# Patient Record
Sex: FEMALE | Race: WHITE | NOT HISPANIC OR LATINO | Employment: FULL TIME | URBAN - NONMETROPOLITAN AREA
[De-identification: names, ages, dates, MRNs, and addresses within clinical notes are randomized per-mention and may not be internally consistent; named-entity substitution may affect disease eponyms.]

---

## 2021-02-19 ENCOUNTER — OFFICE VISIT (OUTPATIENT)
Dept: FAMILY MEDICINE CLINIC | Facility: CLINIC | Age: 36
End: 2021-02-19

## 2021-02-19 VITALS
DIASTOLIC BLOOD PRESSURE: 78 MMHG | HEART RATE: 81 BPM | HEIGHT: 60 IN | TEMPERATURE: 96.9 F | OXYGEN SATURATION: 98 % | SYSTOLIC BLOOD PRESSURE: 126 MMHG | BODY MASS INDEX: 35.18 KG/M2 | WEIGHT: 179.2 LBS

## 2021-02-19 DIAGNOSIS — R05.3 CHRONIC COUGH: ICD-10-CM

## 2021-02-19 DIAGNOSIS — J45.20 MILD INTERMITTENT EXTRINSIC ASTHMA WITHOUT COMPLICATION: ICD-10-CM

## 2021-02-19 DIAGNOSIS — Z13.220 SCREENING CHOLESTEROL LEVEL: ICD-10-CM

## 2021-02-19 DIAGNOSIS — E03.9 ACQUIRED HYPOTHYROIDISM: Primary | ICD-10-CM

## 2021-02-19 PROCEDURE — 80053 COMPREHEN METABOLIC PANEL: CPT | Performed by: FAMILY MEDICINE

## 2021-02-19 PROCEDURE — 80061 LIPID PANEL: CPT | Performed by: FAMILY MEDICINE

## 2021-02-19 PROCEDURE — 99203 OFFICE O/P NEW LOW 30 MIN: CPT | Performed by: FAMILY MEDICINE

## 2021-02-19 PROCEDURE — 85025 COMPLETE CBC W/AUTO DIFF WBC: CPT | Performed by: FAMILY MEDICINE

## 2021-02-19 PROCEDURE — 36415 COLL VENOUS BLD VENIPUNCTURE: CPT | Performed by: FAMILY MEDICINE

## 2021-02-19 PROCEDURE — 84443 ASSAY THYROID STIM HORMONE: CPT | Performed by: FAMILY MEDICINE

## 2021-02-19 RX ORDER — ALBUTEROL SULFATE 90 UG/1
2 AEROSOL, METERED RESPIRATORY (INHALATION) EVERY 4 HOURS PRN
Qty: 18 G | Refills: 2 | Status: SHIPPED | OUTPATIENT
Start: 2021-02-19 | End: 2021-08-26 | Stop reason: SDUPTHER

## 2021-02-19 RX ORDER — LEVOTHYROXINE SODIUM 0.1 MG/1
100 TABLET ORAL DAILY
Qty: 30 TABLET | Refills: 0 | Status: SHIPPED | OUTPATIENT
Start: 2021-02-19 | End: 2021-02-22 | Stop reason: SDUPTHER

## 2021-02-19 RX ORDER — LEVOTHYROXINE SODIUM 0.1 MG/1
100 TABLET ORAL DAILY
COMMUNITY
Start: 2021-01-10 | End: 2021-02-19 | Stop reason: SDUPTHER

## 2021-02-19 NOTE — PROGRESS NOTES
Subjective   Ron Oconnell is a 35 y.o. female.   Pt presents today with CC of Hypothyroidism      History of Present Illness   1.  Patient is a 35-year-old female here to establish care.  She has hypothyroidism.  Her last TSH was abnormal about a year ago, reportedly.  She states that her levothyroxine was increased to 100 mcg at that time.  She has been taking this medication and is only missed 1 dose which was this morning as she has run out of her most recent prescription.  She would like a refill at her current dose.  She is agreeable to labs today.  She denies symptoms of hypothyroidism.  #2 she reports chronic cough that comes and goes, lasts a week or so at a time, happens a few times a year.  She has done well in the past with albuterol inhalers.  She would like a refill of albuterol inhaler.  She takes Zyrtec periodically for allergies.  Last Pap smear was 4 years ago.       The following portions of the patient's history were reviewed and updated as appropriate: allergies, current medications, past family history, past medical history, past social history, past surgical history and problem list.    Review of Systems   Constitutional: Negative for chills, fever and unexpected weight loss.   HENT: Negative for congestion and sore throat.    Eyes: Negative for blurred vision and visual disturbance.   Respiratory: Negative for cough and wheezing.    Cardiovascular: Negative for chest pain and palpitations.   Gastrointestinal: Negative for abdominal pain and diarrhea.   Endocrine: Negative for cold intolerance and heat intolerance.   Genitourinary: Negative for dysuria.   Musculoskeletal: Negative for arthralgias and neck stiffness.   Neurological: Negative for dizziness, seizures and syncope.   Psychiatric/Behavioral: Negative for self-injury, suicidal ideas and depressed mood.       Objective   Physical Exam  Vitals signs and nursing note reviewed.   Constitutional:       Appearance: She is well-developed.    HENT:      Head: Normocephalic and atraumatic.      Right Ear: External ear normal.      Left Ear: External ear normal.      Nose: Nose normal.   Eyes:      Conjunctiva/sclera: Conjunctivae normal.      Pupils: Pupils are equal, round, and reactive to light.   Neck:      Musculoskeletal: Normal range of motion and neck supple.   Cardiovascular:      Rate and Rhythm: Normal rate and regular rhythm.      Heart sounds: Normal heart sounds.   Pulmonary:      Effort: Pulmonary effort is normal.      Breath sounds: Normal breath sounds.   Abdominal:      General: Bowel sounds are normal.      Palpations: Abdomen is soft.   Skin:     General: Skin is warm and dry.   Neurological:      Mental Status: She is alert and oriented to person, place, and time.   Psychiatric:         Behavior: Behavior normal.           Assessment/Plan   Diagnoses and all orders for this visit:    1. Acquired hypothyroidism (Primary)  -     levothyroxine (SYNTHROID, LEVOTHROID) 100 MCG tablet; Take 1 tablet by mouth Daily.  Dispense: 30 tablet; Refill: 0  -     Comprehensive Metabolic Panel; Future  -     CBC Auto Differential; Future  -     TSH; Future  -     Comprehensive Metabolic Panel  -     CBC Auto Differential  -     TSH  We will continue at 100 mcg daily.  30-day supply sent in the case that we need to increase her dose.  2. Screening cholesterol level  -     Lipid Panel; Future  -     Lipid Panel  Because of obesity and BMI of 35.0, will get cholesterol screening.  She is fasting today.  3. Chronic cough  -     albuterol sulfate  (90 Base) MCG/ACT inhaler; Inhale 2 puffs Every 4 (Four) Hours As Needed for Wheezing.  Dispense: 18 g; Refill: 2    4. Mild intermittent extrinsic asthma without complication  -     albuterol sulfate  (90 Base) MCG/ACT inhaler; Inhale 2 puffs Every 4 (Four) Hours As Needed for Wheezing.  Dispense: 18 g; Refill: 2  Discussed the possible diagnoses leading to chronic cough including postnasal drip,  GERD, primary pulmonary disease such as intermittent asthma.  We will continue albuterol for now.  My suspicion is that it is postnasal drip from allergies.                    Patient's Body mass index is 35 kg/m². BMI is above normal parameters. Recommendations include: exercise counseling and nutrition counseling.

## 2021-02-20 LAB
ALBUMIN SERPL-MCNC: 4.4 G/DL (ref 3.5–5.2)
ALBUMIN/GLOB SERPL: 1.5 G/DL
ALP SERPL-CCNC: 82 U/L (ref 39–117)
ALT SERPL W P-5'-P-CCNC: 14 U/L (ref 1–33)
ANION GAP SERPL CALCULATED.3IONS-SCNC: 9.9 MMOL/L (ref 5–15)
AST SERPL-CCNC: 16 U/L (ref 1–32)
BASOPHILS # BLD AUTO: 0.06 10*3/MM3 (ref 0–0.2)
BASOPHILS NFR BLD AUTO: 0.8 % (ref 0–1.5)
BILIRUB SERPL-MCNC: 0.3 MG/DL (ref 0–1.2)
BUN SERPL-MCNC: 14 MG/DL (ref 6–20)
BUN/CREAT SERPL: 22.2 (ref 7–25)
CALCIUM SPEC-SCNC: 9.5 MG/DL (ref 8.6–10.5)
CHLORIDE SERPL-SCNC: 102 MMOL/L (ref 98–107)
CHOLEST SERPL-MCNC: 221 MG/DL (ref 0–200)
CO2 SERPL-SCNC: 26.1 MMOL/L (ref 22–29)
CREAT SERPL-MCNC: 0.63 MG/DL (ref 0.57–1)
DEPRECATED RDW RBC AUTO: 39.7 FL (ref 37–54)
EOSINOPHIL # BLD AUTO: 0.21 10*3/MM3 (ref 0–0.4)
EOSINOPHIL NFR BLD AUTO: 2.9 % (ref 0.3–6.2)
ERYTHROCYTE [DISTWIDTH] IN BLOOD BY AUTOMATED COUNT: 12.2 % (ref 12.3–15.4)
GFR SERPL CREATININE-BSD FRML MDRD: 109 ML/MIN/1.73
GLOBULIN UR ELPH-MCNC: 3 GM/DL
GLUCOSE SERPL-MCNC: 86 MG/DL (ref 65–99)
HCT VFR BLD AUTO: 43.3 % (ref 34–46.6)
HDLC SERPL-MCNC: 48 MG/DL (ref 40–60)
HGB BLD-MCNC: 14 G/DL (ref 12–15.9)
IMM GRANULOCYTES # BLD AUTO: 0.02 10*3/MM3 (ref 0–0.05)
IMM GRANULOCYTES NFR BLD AUTO: 0.3 % (ref 0–0.5)
LDLC SERPL CALC-MCNC: 153 MG/DL (ref 0–100)
LDLC/HDLC SERPL: 3.13 {RATIO}
LYMPHOCYTES # BLD AUTO: 1.85 10*3/MM3 (ref 0.7–3.1)
LYMPHOCYTES NFR BLD AUTO: 25.9 % (ref 19.6–45.3)
MCH RBC QN AUTO: 28.5 PG (ref 26.6–33)
MCHC RBC AUTO-ENTMCNC: 32.3 G/DL (ref 31.5–35.7)
MCV RBC AUTO: 88 FL (ref 79–97)
MONOCYTES # BLD AUTO: 0.52 10*3/MM3 (ref 0.1–0.9)
MONOCYTES NFR BLD AUTO: 7.3 % (ref 5–12)
NEUTROPHILS NFR BLD AUTO: 4.49 10*3/MM3 (ref 1.7–7)
NEUTROPHILS NFR BLD AUTO: 62.8 % (ref 42.7–76)
NRBC BLD AUTO-RTO: 0 /100 WBC (ref 0–0.2)
PLATELET # BLD AUTO: 376 10*3/MM3 (ref 140–450)
PMV BLD AUTO: 10.2 FL (ref 6–12)
POTASSIUM SERPL-SCNC: 4.5 MMOL/L (ref 3.5–5.2)
PROT SERPL-MCNC: 7.4 G/DL (ref 6–8.5)
RBC # BLD AUTO: 4.92 10*6/MM3 (ref 3.77–5.28)
SODIUM SERPL-SCNC: 138 MMOL/L (ref 136–145)
TRIGL SERPL-MCNC: 114 MG/DL (ref 0–150)
TSH SERPL DL<=0.05 MIU/L-ACNC: 4.86 UIU/ML (ref 0.27–4.2)
VLDLC SERPL-MCNC: 20 MG/DL (ref 5–40)
WBC # BLD AUTO: 7.15 10*3/MM3 (ref 3.4–10.8)

## 2021-02-22 DIAGNOSIS — E03.9 ACQUIRED HYPOTHYROIDISM: ICD-10-CM

## 2021-02-22 RX ORDER — LEVOTHYROXINE SODIUM 112 UG/1
112 TABLET ORAL DAILY
Qty: 90 TABLET | Refills: 2 | Status: SHIPPED | OUTPATIENT
Start: 2021-02-22 | End: 2021-08-26 | Stop reason: SDUPTHER

## 2021-06-22 ENCOUNTER — OFFICE VISIT (OUTPATIENT)
Dept: FAMILY MEDICINE CLINIC | Facility: CLINIC | Age: 36
End: 2021-06-22

## 2021-06-22 VITALS
BODY MASS INDEX: 33.96 KG/M2 | TEMPERATURE: 97.3 F | SYSTOLIC BLOOD PRESSURE: 124 MMHG | WEIGHT: 173 LBS | OXYGEN SATURATION: 99 % | HEART RATE: 80 BPM | HEIGHT: 60 IN | DIASTOLIC BLOOD PRESSURE: 82 MMHG

## 2021-06-22 DIAGNOSIS — Z01.419 WELL FEMALE EXAM WITH ROUTINE GYNECOLOGICAL EXAM: Primary | ICD-10-CM

## 2021-06-22 PROCEDURE — 99395 PREV VISIT EST AGE 18-39: CPT | Performed by: NURSE PRACTITIONER

## 2021-06-22 NOTE — PROGRESS NOTES
"Chief Complaint  Gynecologic Exam    Subjective          Kourtney Velasquez presents to [facility]  Gynecologic Exam  Primary symptoms comment: No concerns no pap in several years.  Records with previous PCP do not note pap with last pregnancy 4 years ago.  First born is age 11 and she is sure she had not had a pap . The patient is experiencing no pain. She is not pregnant. She is sexually active. No, her partner does not have an STD. Her menstrual history has been regular (last menses 2 week ago).       Objective   Vital Signs:   /82 (BP Location: Left arm, Patient Position: Sitting, Cuff Size: Adult)   Pulse 80   Temp 97.3 °F (36.3 °C)   Ht 152.4 cm (60\")   Wt 78.5 kg (173 lb)   SpO2 99%   BMI 33.79 kg/m²     Physical Exam  Vitals and nursing note reviewed.   Constitutional:       General: She is not in acute distress.     Appearance: She is well-developed. She is not ill-appearing.   Cardiovascular:      Rate and Rhythm: Normal rate and regular rhythm.      Heart sounds: Normal heart sounds. No murmur heard.     Pulmonary:      Effort: Pulmonary effort is normal.      Breath sounds: Normal breath sounds.   Chest:      Breasts:         Right: Normal.         Left: Normal.   Abdominal:      General: Bowel sounds are normal.      Palpations: Abdomen is soft.      Tenderness: There is no abdominal tenderness.      Hernia: There is no hernia in the left inguinal area or right inguinal area.   Genitourinary:     Labia:         Right: No rash, tenderness, lesion or injury.         Left: No rash, tenderness, lesion or injury.       Vagina: Normal.      Cervix: Normal.      Uterus: Normal.       Adnexa: Right adnexa normal.      Rectum: Normal.   Lymphadenopathy:      Lower Body: No right inguinal adenopathy. No left inguinal adenopathy.   Skin:     General: Skin is warm and dry.   Neurological:      Mental Status: She is alert and oriented to person, place, and time. "   Psychiatric:         Behavior: Behavior normal.        Result Review :                 Assessment and Plan    Diagnoses and all orders for this visit:    1. Well female exam with routine gynecological exam (Primary)  -     Pap IG, Ct-Ng, Rfx HPV ASCU        Follow Up   No follow-ups on file.  Patient was given instructions and counseling regarding her condition or for health maintenance advice. Please see specific information pulled into the AVS if appropriate.

## 2021-06-25 LAB
C TRACH RRNA CVX QL NAA+PROBE: NEGATIVE
CONV .: NORMAL
CYTOLOGIST CVX/VAG CYTO: NORMAL
CYTOLOGY CVX/VAG DOC CYTO: NORMAL
CYTOLOGY CVX/VAG DOC THIN PREP: NORMAL
DX ICD CODE: NORMAL
HIV 1 & 2 AB SER-IMP: NORMAL
N GONORRHOEA RRNA CVX QL NAA+PROBE: NEGATIVE
OTHER STN SPEC: NORMAL
STAT OF ADQ CVX/VAG CYTO-IMP: NORMAL

## 2021-08-26 ENCOUNTER — OFFICE VISIT (OUTPATIENT)
Dept: FAMILY MEDICINE CLINIC | Facility: CLINIC | Age: 36
End: 2021-08-26

## 2021-08-26 VITALS
HEIGHT: 60 IN | BODY MASS INDEX: 33.41 KG/M2 | DIASTOLIC BLOOD PRESSURE: 68 MMHG | OXYGEN SATURATION: 99 % | HEART RATE: 78 BPM | SYSTOLIC BLOOD PRESSURE: 120 MMHG | WEIGHT: 170.2 LBS | TEMPERATURE: 96.9 F

## 2021-08-26 DIAGNOSIS — E03.9 ACQUIRED HYPOTHYROIDISM: ICD-10-CM

## 2021-08-26 DIAGNOSIS — R05.3 CHRONIC COUGH: ICD-10-CM

## 2021-08-26 DIAGNOSIS — J45.20 MILD INTERMITTENT EXTRINSIC ASTHMA WITHOUT COMPLICATION: ICD-10-CM

## 2021-08-26 DIAGNOSIS — F33.0 MILD EPISODE OF RECURRENT MAJOR DEPRESSIVE DISORDER (HCC): Primary | ICD-10-CM

## 2021-08-26 PROCEDURE — 84443 ASSAY THYROID STIM HORMONE: CPT | Performed by: FAMILY MEDICINE

## 2021-08-26 PROCEDURE — 99214 OFFICE O/P EST MOD 30 MIN: CPT | Performed by: FAMILY MEDICINE

## 2021-08-26 PROCEDURE — 36415 COLL VENOUS BLD VENIPUNCTURE: CPT | Performed by: FAMILY MEDICINE

## 2021-08-26 RX ORDER — LEVOTHYROXINE SODIUM 112 UG/1
112 TABLET ORAL DAILY
Qty: 90 TABLET | Refills: 2 | Status: SHIPPED | OUTPATIENT
Start: 2021-08-26 | End: 2021-12-20 | Stop reason: SDUPTHER

## 2021-08-26 RX ORDER — SERTRALINE HYDROCHLORIDE 25 MG/1
25 TABLET, FILM COATED ORAL DAILY
Qty: 90 TABLET | Refills: 1 | Status: SHIPPED | OUTPATIENT
Start: 2021-08-26 | End: 2021-09-16 | Stop reason: SDUPTHER

## 2021-08-26 RX ORDER — ALBUTEROL SULFATE 90 UG/1
2 AEROSOL, METERED RESPIRATORY (INHALATION) EVERY 4 HOURS PRN
Qty: 18 G | Refills: 2 | Status: SHIPPED | OUTPATIENT
Start: 2021-08-26 | End: 2021-12-20 | Stop reason: SDUPTHER

## 2021-08-26 NOTE — PROGRESS NOTES
Subjective   Kourtney Velasquez is a 36 y.o. female.   Pt presents today with CC of Anxiety      History of Present Illness   1.  Patient is a 36-year-old female here to follow-up on depression with anxiety.  Historically, she was on Zoloft for many years.  She is no longer taking this medication.  She reports that she has been depressed, she has been sleeping more than normal, her interest has been poor in hobbies, her energy level has been poor, her concentration has been fair, she denies homicidal or suicidal ideation.  She would like to be started back on Zoloft, and she would like a referral to a therapist.  #2 she has hypothyroidism for which she takes levothyroxine 112 mcg daily.  Her last TSH was borderline high.  She is agreeable to have her levels rechecked today.  #3 she has chronic cough with mild intermittent asthma.  She uses albuterol as needed.  She would like a refill.           The following portions of the patient's history were reviewed and updated as appropriate: allergies, current medications, past family history, past medical history, past social history, past surgical history and problem list.    Review of Systems   Constitutional: Negative for chills, fever and unexpected weight loss.   HENT: Negative for congestion and sore throat.    Eyes: Negative for blurred vision and visual disturbance.   Respiratory: Negative for cough and wheezing.    Cardiovascular: Negative for chest pain and palpitations.   Gastrointestinal: Negative for abdominal pain and diarrhea.   Endocrine: Negative for cold intolerance and heat intolerance.   Genitourinary: Negative for dysuria.   Musculoskeletal: Negative for arthralgias and neck stiffness.   Neurological: Negative for dizziness, seizures and syncope.   Psychiatric/Behavioral: Negative for self-injury, suicidal ideas and depressed mood.       Objective   Physical Exam  Vitals and nursing note reviewed.   Constitutional:       Appearance: She is well-developed.    HENT:      Head: Normocephalic and atraumatic.      Right Ear: External ear normal.      Left Ear: External ear normal.      Nose: Nose normal.   Eyes:      Conjunctiva/sclera: Conjunctivae normal.      Pupils: Pupils are equal, round, and reactive to light.   Cardiovascular:      Rate and Rhythm: Normal rate and regular rhythm.      Heart sounds: Normal heart sounds.   Pulmonary:      Effort: Pulmonary effort is normal.      Breath sounds: Normal breath sounds.   Abdominal:      General: Bowel sounds are normal.      Palpations: Abdomen is soft.   Musculoskeletal:      Cervical back: Normal range of motion and neck supple.   Skin:     General: Skin is warm and dry.   Neurological:      Mental Status: She is alert and oriented to person, place, and time.   Psychiatric:         Mood and Affect: Mood normal.         Behavior: Behavior normal.         Thought Content: Thought content normal.         Judgment: Judgment normal.           Assessment/Plan   Diagnoses and all orders for this visit:    1. Mild episode of recurrent major depressive disorder (CMS/HCC) (Primary)  -     sertraline (Zoloft) 25 MG tablet; Take 1 tablet by mouth Daily.  Dispense: 90 tablet; Refill: 1  -     Psychotherapy; Future  We will restart Zoloft.  It has been several months since she has been on this medication.  We will restart at 25 mg, follow-up in 3 months.  She denies side effects of this medication in the past.  2. Acquired hypothyroidism  -     levothyroxine (SYNTHROID, LEVOTHROID) 112 MCG tablet; Take 1 tablet by mouth Daily.  Dispense: 90 tablet; Refill: 2  -     TSH; Future  -     TSH    3. Chronic cough  -     albuterol sulfate  (90 Base) MCG/ACT inhaler; Inhale 2 puffs Every 4 (Four) Hours As Needed for Wheezing.  Dispense: 18 g; Refill: 2    4. Mild intermittent extrinsic asthma without complication  -     albuterol sulfate  (90 Base) MCG/ACT inhaler; Inhale 2 puffs Every 4 (Four) Hours As Needed for Wheezing.   Dispense: 18 g; Refill: 2                    Patient's Body mass index is 33.24 kg/m². indicating that she is obese (BMI >30). Obesity-related health conditions include the following: none. Obesity is improving with lifestyle modifications. BMI is is above average; BMI management plan is completed. We discussed portion control and increasing exercise..

## 2021-08-27 ENCOUNTER — TELEPHONE (OUTPATIENT)
Dept: FAMILY MEDICINE CLINIC | Facility: CLINIC | Age: 36
End: 2021-08-27

## 2021-08-27 LAB — TSH SERPL DL<=0.05 MIU/L-ACNC: 3.34 UIU/ML (ref 0.27–4.2)

## 2021-08-27 NOTE — TELEPHONE ENCOUNTER
----- Message from Tiburcio Larson DO sent at 8/27/2021  8:01 AM EDT -----  TSH was normal.  Continue current dose.    MADE PATIENT AWARE AND SHE VOICED UNDERSTANDING.

## 2021-09-16 ENCOUNTER — TELEPHONE (OUTPATIENT)
Dept: FAMILY MEDICINE CLINIC | Facility: CLINIC | Age: 36
End: 2021-09-16

## 2021-09-16 DIAGNOSIS — F33.0 MILD EPISODE OF RECURRENT MAJOR DEPRESSIVE DISORDER (HCC): ICD-10-CM

## 2021-09-16 NOTE — TELEPHONE ENCOUNTER
Medication sent in at 50 mg.  This is an increase from 25 mg.  She may use up her 25 mg tablets by taking 2 of them if she would like.  New prescription has been sent to her pharmacy, however.

## 2021-09-16 NOTE — TELEPHONE ENCOUNTER
Caller: Kourtney Velasquez    Relationship: Self    Best call back number: [phone]    What medication are you requesting:SERTRALINE 50 MG      If a prescription is needed, what is your preferred pharmacy and phone number: [id number] - [place] [address] AT [facility] [address] - [phone]  - [phone] FX     Additional notes: PATIENT IS REQUESTING AN INCREASE IN DOSAGE

## 2021-09-20 ENCOUNTER — OFFICE VISIT (OUTPATIENT)
Dept: PSYCHIATRY | Facility: CLINIC | Age: 36
End: 2021-09-20

## 2021-09-20 DIAGNOSIS — F41.1 GENERALIZED ANXIETY DISORDER: Primary | ICD-10-CM

## 2021-09-20 DIAGNOSIS — F33.0 MDD (MAJOR DEPRESSIVE DISORDER), RECURRENT EPISODE, MILD (HCC): ICD-10-CM

## 2021-09-20 PROCEDURE — 90791 PSYCH DIAGNOSTIC EVALUATION: CPT | Performed by: SOCIAL WORKER

## 2021-09-20 NOTE — PROGRESS NOTES
"Patient ID: Kourtney Velasquez is a 36 y.o. female presenting to [facility] for assessment with Riri Rico LCSW.     Time In: 8:45 am  Time Out: 9:45 am  Name of PCP: Dr. Larson  Referral source: Dr. Larson    Chief Complaint: \"Mostly anxiety and a little bit of depression\"    HPI  Pt stated that her and her  and children moved to this area, and away from everyone she has known. Pt stated that they moved in May 2020. Pt stated that her  works most of the time. Pt stated that they moved from [place], [place] to [place] due to her  getting a job. Pt stated that she has 2 children, ages 9 and 4. Pt stated that her family and friends are in [place]. Pt stated that she went from working full-time to part-time. Pt stated that she is working to get out of the house, but they do not have childcare on nights and weekends. Pt stated that she is still in contact with her friends. Pt stated that her  is the owner of [employer] in [place]. 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 Pt stated that she was previously working at a .     Pt stated that she has had anxiety throughout her entire life, but it has increased. Pt stated that her anxiety comes and goes throughout her life. Pt stated that she has a lot of health anxiety. Pt stated that she often feels like something is physically wrong or that she is going to die.  Pt stated that a lot of times she can talk herself down. Pt stated that she has had 2 panic attacks in the past 2 months. Pt stated this is not common for her. Pt stated that she feels as though her heart is beating weird, and \"a flush over her\" and \"feels like she is having a stroke\". Pt stated these symptoms have been present throughout her life. Pt stated that she is having increased sensitivity to loud sounds. Pt stated the sounds easily irritate her. Pt stated that this has been " "present since they moved. Pt stated that other symptoms of anxiety include: heart palpitations, racing thoughts, negative thoughts, feelings of impending doom, forgetfulness, \"mom guilt\". Pt stated that anxiety increases when she is by herself. Pt stated that she has increased anxiety related to driving, especially driving alone with her children.     Pt stated that she does not have any difficulty going to or staying asleep. Pt stated that she was having some difficulty getting out of bed, due to lack of motivation and energy. Pt stated that since resuming her Zoloft that has changed.     Pt stated that she has experienced decreased pleasure in activities. Pt stated that she does not know if she wants to stay at home or if she is scared to take her children out in public. Pt stated that her 4 year old can have some negative behaviors when out and that causes some anxiety.     Pt denied any SI. Pt denied any previous attempts or self harm. Pt denied HI or AVH at time of assessment.     Social History:  Pt was born and raised in [place], [place]. Pt stated that she had a good childhood. Pt stated that her parents are still . Pt stated that she met her current  while working together at [employer]. Pt stated she first got  at age 27, and her 9 year old is their child. Pt stated that they got  due to pregnancy. Pt stated that they were  approximately a year before they . Pt stated that her ex  had a history of substance use. Pt stated that he has since been in treatment, but does not see the child often. Pt stated that she has been  to her current  for 5 years.     Significant Life Events  Has patient been through or witnessed a divorce? yes  Pt has been  one time.    Has patient experienced a death / loss of relationship? no  NA    Has patient experienced a major accident or tragic events? no  NA    Has patient experienced any other " significant life events or trauma (such as verbal, physical, sexual abuse)? no  Patient denied.     Work History  Highest level of education obtained: some college    Ever been active duty in the ? no    Patient's Occupation:  for [employer]    Describe patient's current and past work experience: , , vet tech, retail, and [employer] for 6 years as .       Legal History  The patient has no significant history of legal issues.    Interpersonal/Relational  Marital Status:   Patient's current living situation: Pt currently lives with  and 2 children.   Support system: mother, mother-in-law. No one in this area.  Difficulty getting along with peers: no  Difficulty making new friendships: yes  Difficulty maintaining friendships: no  Close with family members: yes    Mental/Behavioral Health History  History of prior treatment or hospitalization: None reported.    Are there any significant health issues (current or past): hypothyroid    History of seizures: no    Family History of Mental Health:  Mother- depression; maternal uncle- depression    Family History   Problem Relation Age of Onset   • Hypertension Mother    • Cancer Father        Current Medications:   Current Outpatient Medications   Medication Sig Dispense Refill   • albuterol sulfate  (90 Base) MCG/ACT inhaler Inhale 2 puffs Every 4 (Four) Hours As Needed for Wheezing. 18 g 2   • levothyroxine (SYNTHROID, LEVOTHROID) 112 MCG tablet Take 1 tablet by mouth Daily. 90 tablet 2   • sertraline (Zoloft) 50 MG tablet Take 1 tablet by mouth Daily. 90 tablet 0     No current facility-administered medications for this visit.       History of Substance Use:   Patient answered no  to experiencing two or more of the following problems related to substance use: using more than intended or over longer period than intended; difficulty quitting or cutting back use; spending a great deal of  time obtaining, using, or recovering from using; craving or strong desire or urge to use;  work and/or school problems; financial problems; family problems; using in dangerous situations; physical or mental health problems; relapse; feelings of guilt or remorse about use; times when used and/or drank alone; needing to use more in order to achieve the desired effect; illness or withdrawal when stopping or cutting back use; using to relieve or avoid getting ill or developing withdrawal symptoms; and black outs and/or memory issues when using.        Substance Age Frequency Amount Method Last use   Nicotine        Alcohol        Marijuana        Benzo        Pain Pills        Cocaine        Meth        Heroin        Suboxone        Synthetics/Other:              PHQ-Score Total:  PHQ-9 Total Score:  16- Moderately Severe Depression  YADY-7 Total Score: 18- Severe Anxiety    (Scales based on 0 - 10 with 10 being the worst)  Depression: 5 Anxiety: 2       SUICIDE RISK ASSESSMENT/CSSRS  1. Does patient have thoughts of suicide? no  2. Does patient have intent for suicide? no  3. Does patient have a current plan for suicide? no  4. History of suicide attempts: no  5. Family history of suicide or attempts: no  6. History of violent behaviors towards others or property or thoughts of committing suicide: no  7. History of sexual aggression toward others: no  8. Access to firearms or weapons: yes    Mental Status Exam:   Hygiene:   good  Cooperation:  Cooperative  Eye Contact:  Good  Psychomotor Behavior:  Appropriate  Affect:  Appropriate  Mood: fluctates  Hopelessness: Denies  Speech:  Normal  Thought Process:  Goal directed  Thought Content:  Mood congruent  Suicidal:  None  Homicidal:  None  Hallucinations:  None  Delusion:  None  Memory:  Intact  Orientation:  Person, Place, Time and Situation  Reliability:  fair  Insight:  Fair  Judgement:  Fair  Impulse Control:  Fair    Impression/Formulation:    VISIT DIAGNOSIS:      "ICD-10-CM ICD-9-CM   1. Generalized anxiety disorder  F41.1 300.02   2. MDD (major depressive disorder), recurrent episode, mild (CMS/HCC)  F33.0 296.31        Patient appeared alert and oriented.  Patient is voluntarily requesting to begin outpatient therapy at [facility].  Patient is receptive to assistance with maintaining a stable lifestyle.  Patient presents with history of anxiety and depression.  Patient is agreeable to attend routine therapy sessions.  Patient expressed desire to maintain stability and participate in the therapeutic process.        Crisis Plan:  Symptoms and/or behaviors to indicate a crisis: Excessive worry or fear, Feeling sad or low, Extreme mood changes; including uncontrollable \"highs\" or euphoria, Prolonged irritability or anger, Isolation, Increased hunger or lack of appetite, Difficulty perceiving reality , Physical ailments without obvious causes and Self-doubt    What calming techniques or other strategies will patient use to de-esclate and stay safe: slow down, breathe, visualize calming self, think it though, listen to music, change focus, take a walk    Who is one person patient can contact to assist with de-escalation? Geraldine- friend    If symptoms/behaviors persist, patient will present to the nearest hospital for an assessment. Advised patient of [facility] ER 24/7 assessment services.       Plan:   Obtain release of information for current treatment team for continuity of care  Patient will adhere to medication regimen as prescribed and report any side effects. Patient will contact this office, call 911 or present to the nearest emergency room should suicidal or homicidal ideations occur.  Begin psychotherapy next week and do weekly appointments.       This document has been electronically signed by Riri Rico LCSW  September 20, 2021 09:54 EDT      Part of this note may be an electronic transcription/translation of spoken language to printed " text using the Dragon Dictation System.

## 2021-10-11 ENCOUNTER — OFFICE VISIT (OUTPATIENT)
Dept: PSYCHIATRY | Facility: CLINIC | Age: 36
End: 2021-10-11

## 2021-10-11 DIAGNOSIS — F33.0 MDD (MAJOR DEPRESSIVE DISORDER), RECURRENT EPISODE, MILD (HCC): ICD-10-CM

## 2021-10-11 DIAGNOSIS — F41.1 GENERALIZED ANXIETY DISORDER: Primary | ICD-10-CM

## 2021-10-11 PROCEDURE — 90837 PSYTX W PT 60 MINUTES: CPT | Performed by: SOCIAL WORKER

## 2021-10-11 NOTE — PROGRESS NOTES
Date: October 11, 2021  Time In: 9:00 am  Time Out: 10:00 am      PROGRESS NOTE  Data:  Kourtney Velasquez is a 36 y.o. female who presents today for individual therapy session at [facility] with Riri Rico LCSW.  Patient stated that she does not think her medications seem to be working at this time.  Patient stated that she has previously taken Zoloft but has been off of it for approximately a year.  Patient stated that PCP had restarted her on the medication but started at a low dosage.  Patient stated that she recently had an increase but it was not at the dosage that she has previously been on.  Patient stated that she feels like she has had an increase in depression, and increase in sleep, decreased motivation, decrease in patient's, and is often easily irritated.  Patient stated that most of the symptoms are towards her children.  Patient stated that she often yells at her 4-year-old.  Patient stated that in the past she has struggled with anxiety but she is not struggled with depression before.  Patient stated that she continues to have a lot of health anxiety.  Patient stated that her  had increased blood pressure and had to stay in the hospital overnight and that also increased her anxiety.  Patient stated that her 9-year-old is getting bullied at school.  Patient and therapist discussed ways to help manage irritability and anger at home.      Clinical Maneuvering/Intervention:    (Scales based on 0 - 10 with 10 being the worst)  Depression: 7 Anxiety: 6       Assisted patient in processing above session content; acknowledged and normalized patient’s thoughts, feelings, and concerns.  Rationalized patient thought process regarding anxiety and depression.  Discussed triggers associated with patient's anxiety and depression.  Also discussed coping skills for patient to implement such as self-care and timeout.    Allowed patient to freely discuss issues without interruption or  judgment. Provided safe, confidential environment to facilitate the development of positive therapeutic relationship and encourage open, honest communication. Assisted patient in identifying risk factors which would indicate the need for higher level of care including thoughts to harm self or others and/or self-harming behavior and encouraged patient to contact this office, call 911, or present to the nearest emergency room should any of these events occur. Discussed crisis intervention services and means to access. Patient adamantly and convincingly denies current suicidal or homicidal ideation or perceptual disturbance.    Assessment   Patient appears to maintain relative stability as compared to their baseline.  However, patient continues to struggle with anxiety and depression which continues to cause impairment in important areas of functioning.  A result, they can be reasonably expected to continue to benefit from treatment and would likely be at increased risk for decompensation otherwise.    Mental Status Exam:   Hygiene:   good  Cooperation:  Cooperative  Eye Contact:  Good  Psychomotor Behavior:  Appropriate  Affect:  Appropriate  Mood: depressed and anxious  Speech:  Normal  Thought Process:  Goal directed  Thought Content:  Mood congruent  Suicidal:  None  Homicidal:  None  Hallucinations:  None  Delusion:  None  Memory:  Intact  Orientation:  Person, Place, Time and Situation  Reliability:  fair  Insight:  Fair  Judgement:  Fair  Impulse Control:  Fair  Physical/Medical Issues:  No        Patient's Support Network Includes:  , mother and extended family    Functional Status: Moderate impairment     Progress toward goal: Not at goal    Prognosis: Fair with Ongoing Treatment          Plan     Patient to work on improved communication and setting boundaries.  Patient will continue in individual outpatient therapy with focus on improved functioning and coping skills, maintaining stability, and  avoiding decompensation and the need for higher level of care.    Patient will adhere to medication regimen as prescribed and report any side effects. Patient will contact this office, call 911 or present to the nearest emergency room should suicidal or homicidal ideations occur. Provide Cognitive Behavioral Therapy and Solution Focused Therapy to improve functioning, maintain stability, and avoid decompensation and the need for higher level of care.     Return in about 2 weeks, or earlier if symptoms worsen or fail to improve.           VISIT DIAGNOSIS:     ICD-10-CM ICD-9-CM   1. Generalized anxiety disorder  F41.1 300.02   2. MDD (major depressive disorder), recurrent episode, mild (HCC)  F33.0 296.31        This document has been electronically signed by Riri Rico LCSW, October 11, 2021, 13:08 EDT    Part of this note may be an electronic transcription/translation of spoken language to printed text using the Dragon Dictation System.

## 2021-10-26 ENCOUNTER — OFFICE VISIT (OUTPATIENT)
Dept: PSYCHIATRY | Facility: CLINIC | Age: 36
End: 2021-10-26

## 2021-10-26 DIAGNOSIS — F41.1 GENERALIZED ANXIETY DISORDER: Primary | ICD-10-CM

## 2021-10-26 DIAGNOSIS — F33.0 MDD (MAJOR DEPRESSIVE DISORDER), RECURRENT EPISODE, MILD (HCC): ICD-10-CM

## 2021-10-26 PROCEDURE — 90837 PSYTX W PT 60 MINUTES: CPT | Performed by: SOCIAL WORKER

## 2021-10-26 NOTE — PROGRESS NOTES
Date: October 26, 2021  Time In: 8:45 am  Time Out: 9:45 am      PROGRESS NOTE  Data:  Kourtney Velasquez is a 36 y.o. female who presents today for individual therapy session at [facility] with Riri Rico LCSW.  Patient stated her 4-year-old is currently having to stay at home due to one of the other kids in his class having COVID.  Patient stated that this is caused an increase in her negative symptoms.  Patient stated that she try to utilize a timeout for herself but was already upset by the time she realized she should.  Patient stated that her son has a lot of energy and is up all of the time.  Patient and therapist discussed routines and exercise.  Patient stated that when her  is home he mostly sits at the computer and plays games.  Patient stated that she understands that he needs time to decompress but it is also frustrating.  Patient and therapist discussed continued struggles with the move to [place].  Patient stated that she is not happy.  Patient stated that she misses having her friends and family near her.  Patient stated that she has not been able to make any friends or connections with others since moving to [place] a year and a half ago. Pt stated that it continues to be difficult for her and she has not found a way to establish any connections. Pt stated that her children are involved in some activities, but they are independent activities. Pt stated that she is taking her Zoloft, but it is still not effective. Pt stated that she was previously on 150mg. Will schedule pt appointment with medication provider.       Clinical Maneuvering/Intervention:    (Scales based on 0 - 10 with 10 being the worst)  Depression: 8 Anxiety: 6       Assisted patient in processing above session content; acknowledged and normalized patient’s thoughts, feelings, and concerns.  Rationalized patient thought process regarding depression and change.  Discussed triggers associated with  patient's depression.  Also discussed coping skills for patient to implement such as timeouts.    Allowed patient to freely discuss issues without interruption or judgment. Provided safe, confidential environment to facilitate the development of positive therapeutic relationship and encourage open, honest communication. Assisted patient in identifying risk factors which would indicate the need for higher level of care including thoughts to harm self or others and/or self-harming behavior and encouraged patient to contact this office, call 911, or present to the nearest emergency room should any of these events occur. Discussed crisis intervention services and means to access. Patient adamantly and convincingly denies current suicidal or homicidal ideation or perceptual disturbance.    Assessment   Patient appears to maintain relative stability as compared to their baseline.  However, patient continues to struggle with depression and change which continues to cause impairment in important areas of functioning.  A result, they can be reasonably expected to continue to benefit from treatment and would likely be at increased risk for decompensation otherwise.    Mental Status Exam:   Hygiene:   good  Cooperation:  Cooperative  Eye Contact:  Good  Psychomotor Behavior:  Appropriate  Affect:  Appropriate  Mood: sad and depressed  Speech:  Normal  Thought Process:  Goal directed  Thought Content:  Mood congruent  Suicidal:  None  Homicidal:  None  Hallucinations:  None  Delusion:  None  Memory:  Intact  Orientation:  Person, Place, Time and Situation  Reliability:  fair  Insight:  Fair  Judgement:  Fair  Impulse Control:  Fair  Physical/Medical Issues:  No      PHQ-Score Total:  PHQ-9 Total Score:        Patient's Support Network Includes:  , mother and extended family    Functional Status: Moderate impairment     Progress toward goal: Not at goal    Prognosis: Fair with Ongoing Treatment          Plan     Patient  will think about activities that she would enjoy.  Patient will schedule and attend appointment with psychiatric nurse practitioner.  Patient will continue in individual outpatient therapy with focus on improved functioning and coping skills, maintaining stability, and avoiding decompensation and the need for higher level of care.    Patient will adhere to medication regimen as prescribed and report any side effects. Patient will contact this office, call 911 or present to the nearest emergency room should suicidal or homicidal ideations occur. Provide Cognitive Behavioral Therapy and Solution Focused Therapy to improve functioning, maintain stability, and avoid decompensation and the need for higher level of care.     Return in about 2 weeks, or earlier if symptoms worsen or fail to improve.           VISIT DIAGNOSIS:     ICD-10-CM ICD-9-CM   1. Generalized anxiety disorder  F41.1 300.02   2. MDD (major depressive disorder), recurrent episode, mild (HCC)  F33.0 296.31        This document has been electronically signed by Riri Rico LCSW, October 26, 2021, 10:32 EDT    Part of this note may be an electronic transcription/translation of spoken language to printed text using the Dragon Dictation System.

## 2021-11-04 ENCOUNTER — OFFICE VISIT (OUTPATIENT)
Dept: PSYCHIATRY | Facility: CLINIC | Age: 36
End: 2021-11-04

## 2021-11-04 VITALS
SYSTOLIC BLOOD PRESSURE: 123 MMHG | BODY MASS INDEX: 33.85 KG/M2 | HEART RATE: 74 BPM | HEIGHT: 60 IN | DIASTOLIC BLOOD PRESSURE: 79 MMHG | WEIGHT: 172.4 LBS

## 2021-11-04 DIAGNOSIS — F90.0 ADHD (ATTENTION DEFICIT HYPERACTIVITY DISORDER), INATTENTIVE TYPE: ICD-10-CM

## 2021-11-04 DIAGNOSIS — F32.2 CURRENT SEVERE EPISODE OF MAJOR DEPRESSIVE DISORDER WITHOUT PSYCHOTIC FEATURES WITHOUT PRIOR EPISODE (HCC): Primary | ICD-10-CM

## 2021-11-04 DIAGNOSIS — F41.1 GENERALIZED ANXIETY DISORDER: ICD-10-CM

## 2021-11-04 PROCEDURE — 90792 PSYCH DIAG EVAL W/MED SRVCS: CPT

## 2021-11-04 RX ORDER — PROPRANOLOL HYDROCHLORIDE 10 MG/1
10 TABLET ORAL 2 TIMES DAILY PRN
Qty: 15 TABLET | Refills: 0 | Status: SHIPPED | OUTPATIENT
Start: 2021-11-04 | End: 2021-12-02

## 2021-11-04 RX ORDER — SERTRALINE HYDROCHLORIDE 100 MG/1
TABLET, FILM COATED ORAL
Qty: 38 TABLET | Refills: 0 | Status: SHIPPED | OUTPATIENT
Start: 2021-11-04 | End: 2021-12-02 | Stop reason: SDUPTHER

## 2021-11-04 RX ORDER — BUPROPION HYDROCHLORIDE 150 MG/1
150 TABLET ORAL EVERY MORNING
Qty: 30 TABLET | Refills: 0 | Status: SHIPPED | OUTPATIENT
Start: 2021-11-04 | End: 2021-12-02 | Stop reason: SDUPTHER

## 2021-11-04 RX ORDER — PRENATAL VIT NO.126/IRON/FOLIC 28MG-0.8MG
TABLET ORAL DAILY
COMMUNITY

## 2021-11-04 RX ORDER — CETIRIZINE HYDROCHLORIDE 10 MG/1
10 TABLET ORAL DAILY
COMMUNITY
End: 2021-12-20 | Stop reason: SDUPTHER

## 2021-11-04 NOTE — PROGRESS NOTES
"Subjective   Kourtney Velasquez is a 36 y.o. female who is here today for initial appointment to evaluate for medication options.  Patient is a referral from Riri Rico LCSW.    Chief Complaint: Anxiety and depression.    HPI:  History of Present Illness  Patient presents today with anxiety and new onset worsening depression that has began over the last year.  She states that everything is gotten worse since she and her family have moved to [place] for employment.  She identifies that she has struggled with anxiety her entire life.  She catches herself worrying all the time over things that she cannot help, always goes to the \"worst case scenario\" and \"what if moments.\"  She identifies that her anxiety is a 6 or 7 on a scale of 0-10 with 10 being the worst.  She states that she has difficulty \" turning her mind off.\"  She expresses that most of what she worries about is focused on her children and their safety.  She endorses occasional episodes of panic that occur on a monthly basis.  She states that she is able to talk herself down when this occurs.  She states \" I feel like I am dying, having a stroke, heart attack, chest pain, my heart racing... I know all of this is my anxiety and that I am okay.\"  Depressive symptomatology has began to appear over the last year and has increased in the last month.  She identifies anhedonia, irritability, crying spells that occur 2-3 times per week, struggling with self care and going to work.  She states that she would just rather lay in bed.  She endorses that she has became more \" snippy with her children.\" She identifies that she has a decreased appetite and eating 1 meal per day; however, has increased in snacking.  She has not noted any significant weight fluctuations. Body mass index is 33.67 kg/m².  Identifies decreased libido.  She states \" I have been having a hard time getting myself to go to work...  My  is my boss so there is no consequences if I do not go to " "work… I do not have to.\" She rates her depression at a 9 or 10 on bad days.  She identifies intrusive thoughts focusing on death.  She states \" I am always afraid of dying or someone around me dying.\"  She states \" I home the entire time that I eat as it makes me feel like it will prevent me from choking and dying.\"  She states her sleep is good and is averaging 7 to 8 hours per night.  Denies nightmares.  She endorses that she has struggled with focus and attention throughout childhood followed into adulthood.  She states that when she was in school she got in trouble for talking too much in class when she was supposed to be quiet.  She identifies difficulty in initiating tasks because she does not know where to start and feels overwhelmed.  She identifies that throughout childhood and into adulthood that she has procrastinated input work off into the last minute as she works better during this time current.  She that she has always started stuff and not finished things.  She identifies periods of hyperfocus where she will go all in for a hobby and then drop it and start something else.  She identifies that she feels disorganized.  And that she is always been indecisive.  She states that she engages sometimes with overspending.  She states \" when I start cleaning house I will start washing dishes, then I will see some towels that need folded, then I will  toys, then I will go to another room and start... I clean all day long and never get 1 thing finished.\"  She identifies that she drinks multiple caffeinated beverages per day; she states that she is able to calm down and focus after she drinks these.  She denies compulsive behavior.  She denies auditory and visual hallucinations.  Denies any history of abuse.  Denies any symptomology of PTSD.  She denies self-harm both current and past.  He adamantly and convincingly denies suicidal or homicidal ideation both currently and in the past.    Past Psych " History: She has been previously diagnosed with generalized anxiety disorder.  She has seen psychiatric providers in the past in her hometown [place], [place].  Denies past inpatient psychiatric hospitalizations.  Denies past self-harm.  Denies past suicidal ideation or attempts.    Previous Psych Meds: Patient states that she has been on medication for anxiety since she was in the first grade.  She does not remember what she took at this time.  She recalls starting Zoloft when she was in middle school.  She states that she has been on this medication on and off throughout adulthood.  She has came on and off of this medication however when discontinuing shortly after her symptoms became worse and has had a start back on it.  She states that she was placed on Xanax while in high school for panic; however, she never took it because she was nervous about taking medication.    Substance Abuse: Denies current or past alcohol use.  Denies current or past substance use.  Denies smoking cigarettes.    Social History: She grew up with mom and dad in [place], [place].  She had 2 siblings; 1 brother and 1 sister.  Both siblings live in [place], [place].  She is not close to them only seeing them on the holidays.  She was  in the past and has 1 child from this relationship.  No MCC problems.  She is currently  and has been for the last 5 years.  She identifies that this is a stable and supportive relationship.  She has 1 child with her  who is 4 years old.  Her  owns [employer] in [facility] and works frequently.  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  Denies past or current legal issues.      Family Psychiatric History:  family history includes Cancer in her father; Hypertension in her mother.    Medical/Surgical History:  Past Medical History:   Diagnosis Date   • Anxiety    • Hypothyroidism      Past Surgical History:   Procedure  Laterality Date   •  SECTION     • KIDNEY STONE SURGERY     • WISDOM TOOTH EXTRACTION         Allergies   Allergen Reactions   • Penicillins Hives           Current Medications:   Current Outpatient Medications   Medication Sig Dispense Refill   • albuterol sulfate  (90 Base) MCG/ACT inhaler Inhale 2 puffs Every 4 (Four) Hours As Needed for Wheezing. 18 g 2   • cetirizine (zyrTEC) 10 MG tablet Take 10 mg by mouth Daily.     • levothyroxine (SYNTHROID, LEVOTHROID) 112 MCG tablet Take 1 tablet by mouth Daily. 90 tablet 2   • Magnesium Gluconate (MAGNESIUM 27 PO) Take  by mouth.     • prenatal vitamin (prenatal, CLASSIC, vitamin) tablet Take  by mouth Daily.     • buPROPion XL (Wellbutrin XL) 150 MG 24 hr tablet Take 1 tablet by mouth Every Morning for 30 days. 30 tablet 0   • propranolol (INDERAL) 10 MG tablet Take 1 tablet by mouth 2 (Two) Times a Day As Needed (anxiety). 15 tablet 0   • sertraline (Zoloft) 100 MG tablet Take 1 tablet by mouth Daily for 14 days, THEN 1.5 tablets Daily for 16 days. Increase dose to 100 mg for 2 weeks then increase 150 mg after 38 tablet 0     No current facility-administered medications for this visit.         Review of Systems   Constitutional: Positive for activity change and appetite change.   HENT: Negative.    Eyes: Negative.    Respiratory: Negative.    Cardiovascular: Negative.  Negative for chest pain and palpitations.   Gastrointestinal: Negative.    Endocrine: Negative.  Negative for cold intolerance.   Genitourinary: Negative.    Musculoskeletal: Negative.    Skin: Negative.    Allergic/Immunologic: Positive for environmental allergies.   Neurological: Negative for dizziness, tremors, seizures, speech difficulty, weakness and headaches.   Hematological: Negative.    Psychiatric/Behavioral: Positive for decreased concentration and dysphoric mood. Negative for suicidal ideas. The patient is nervous/anxious.     denies HEENT, cardiovascular, respiratory, liver,  "renal, GI/, endocrine, neuro, DERM, hematology, immunology, musculoskeletal disorders.    Objective   Physical Exam  Blood pressure 123/79, pulse 74, height 152.4 cm (60\"), weight 78.2 kg (172 lb 6.4 oz), not currently breastfeeding.    Mental Status Exam:   Hygiene:   good  Cooperation:  Cooperative  Eye Contact:  Good  Psychomotor Behavior:  Restless  Affect:  Full range  Hopelessness: 5  Speech:  Normal  Thought Process:  Goal directed  Thought Content:  Normal  Suicidal:  None  Homicidal:  None  Hallucinations:  None  Delusion:  None  Memory:  Intact  Orientation:  Person, Place, Time and Situation  Reliability:  good  Insight:  Good  Judgement:  Good  Impulse Control:  Good  Physical/Medical Issues:  No       Short-term goals: Patient will be compliant with clinic appointments.  Patient will be engaged in therapy, medication compliant with minimal side effects. Patient  will report decrease of symptoms and frequency.    Long-term goals: Patient will have minimal symptoms of  with continued medication management. Patient will be compliant with treatment and appointments.     PHQ-9 Depression Screening  Little interest or pleasure in doing things? 3   Feeling down, depressed, or hopeless? 3   Trouble falling or staying asleep, or sleeping too much? 1   Feeling tired or having little energy? 3   Poor appetite or overeating? 3   Feeling bad about yourself - or that you are a failure or have let yourself or your family down? 1   Trouble concentrating on things, such as reading the newspaper or watching television? 3   Moving or speaking so slowly that other people could have noticed? Or the opposite - being so fidgety or restless that you have been moving around a lot more than usual? 0   Thoughts that you would be better off dead, or of hurting yourself in some way? 0   PHQ-9 Total Score 17   If you checked off any problems, how difficult have these problems made it for you to do your work, take care of things at " home, or get along with other people? Very difficult     YADY-7  Feeling nervous, anxious or on edge: Nearly every day  Not being able to stop or control worrying: Nearly every day  Worrying too much about different things: Nearly every day  Trouble Relaxing: Nearly every day  Being so restless that it is hard to sit still: Nearly every day  Feeling afraid as if something awful might happen: More than half the days  Becoming easily annoyed or irritable: Nearly every day  YADY 7 Total Score: 20    Koutrney Velasquez  reports that she has never smoked. She has never used smokeless tobacco.. I     Functional Status: severe impairment in areas of daily functioning.  Prognosis: Guarded dependent on medication/follow up and treatment plan compliance.    Assessment/Plan   Problems Addressed this Visit     None      Visit Diagnoses     Current severe episode of major depressive disorder without psychotic features without prior episode (HCC)    -  Primary    Relevant Medications    sertraline (Zoloft) 100 MG tablet    buPROPion XL (Wellbutrin XL) 150 MG 24 hr tablet    Generalized anxiety disorder        Relevant Medications    sertraline (Zoloft) 100 MG tablet    buPROPion XL (Wellbutrin XL) 150 MG 24 hr tablet    propranolol (INDERAL) 10 MG tablet    ADHD (attention deficit hyperactivity disorder), inattentive type        Relevant Medications    sertraline (Zoloft) 100 MG tablet    buPROPion XL (Wellbutrin XL) 150 MG 24 hr tablet      Diagnoses       Codes Comments    Current severe episode of major depressive disorder without psychotic features without prior episode (HCC)    -  Primary ICD-10-CM: F32.2  ICD-9-CM: 296.23     Generalized anxiety disorder     ICD-10-CM: F41.1  ICD-9-CM: 300.02     ADHD (attention deficit hyperactivity disorder), inattentive type     ICD-10-CM: F90.0  ICD-9-CM: 314.00             Discussed medication options.  Increase Zoloft to 100 mg p.o. daily for 2 weeks and then increase to 150 mg p.o. daily  after for anxiety and depression.  She denies any current side effects and appears to be tolerating this medication well.  Start Wellbutrin  mg p.o. daily for depression and ADHD.  Start propanolol 10 mg p.o. twice daily as needed for anxiety. Pita CPT 3 completed today and reviewed identifying 6 atypical T-scores suggestive of ADHD with impairment in attention and impulsivity.  I've explained to her that drugs of the SSRI class can have side effects such as weight gain, sexual dysfunction, insomnia, headache, nausea. These medications are generally effective at alleviating symptoms of anxiety and/or depression. Discussed that Wellbutrin has potential to increase heart rate and blood pressure. Discussed with patient the importance of taking Wellbutrin in the morning as it can be activating and that it may increase his anxiety initially.  Educated that propanolol may cause dizziness upon standing therefore to rise slowly when getting up as it is a beta-blocker that has potential to decrease heart rate and blood pressure.  Discussed with patient that untreated ADHD in adults can worsen existing anxiety and depression. Encouraged the patient to continue with psychotherapy to aid in coping skills and she is agreeable with this at this time. Discussed the risks, benefits, and side effects of the medication; client acknowledged and verbally consented.  Patient is aware to contact the [facility] with any worsening of symptom. Patient is agreeable to go to the ER or call 911 should they begin SI/HI.  Prescription sent to pharmacy at this time.    SUPPORTIVE PSYCHOTHERAPY: continuing efforts to promote the therapeutic alliance, address the patient’s issues, and strengthen self awareness, insights, and coping skills.  Assisted patient in processing above session content; acknowledged and normalized patient’s thoughts, feelings, and concerns.  Applied  positive coping skills and behavior management in  "session.Allowed patient to freely discuss issues without interruption or judgment. Provided safe, confidential environment to facilitate the development of positive therapeutic relationship and encourage open, honest communication. Assisted patient in identifying risk factors which would indicate the need for higher level of care including thoughts to harm self or others and/or self-harming behavior and encouraged patient to contact this office, call 911, or present to the nearest emergency room should any of these events occur. Discussed crisis intervention services and means to access.  Patient adamantly and convincingly denies current suicidal or homicidal ideation or perceptual disturbance.    This APRN reviewed with patient behavioral interventions that have been shown to be helpful with ADHD behaviors. These include but are not limited to:  · Maintaining a daily schedule  · Keeping distractions to a minimum  · Setting small, reachable goals   · Rewarding positive behavior  · Using charts and checklists to help stay \"on task\"  · Limiting choices    Return in about 4 weeks (around 12/2/2021), or if symptoms worsen or fail to improve, for Next scheduled follow up.    This document has been electronically signed by DANG East   November 4, 2021 13:04 EDT   Errors in dictation may reflect use of voice recognition software and not all errors in transcription may have been detected prior to signing.  "

## 2021-11-09 ENCOUNTER — OFFICE VISIT (OUTPATIENT)
Dept: PSYCHIATRY | Facility: CLINIC | Age: 36
End: 2021-11-09

## 2021-11-09 DIAGNOSIS — F41.1 GENERALIZED ANXIETY DISORDER: ICD-10-CM

## 2021-11-09 DIAGNOSIS — F32.2 CURRENT SEVERE EPISODE OF MAJOR DEPRESSIVE DISORDER WITHOUT PSYCHOTIC FEATURES WITHOUT PRIOR EPISODE (HCC): Primary | ICD-10-CM

## 2021-11-09 DIAGNOSIS — F90.0 ADHD (ATTENTION DEFICIT HYPERACTIVITY DISORDER), INATTENTIVE TYPE: ICD-10-CM

## 2021-11-09 PROCEDURE — 90837 PSYTX W PT 60 MINUTES: CPT | Performed by: SOCIAL WORKER

## 2021-11-09 NOTE — PROGRESS NOTES
Date: November 9, 2021  Time In: 9:00 am  Time Out: 10:00 am      PROGRESS NOTE  Data:  Kourtney Velasquez is a 36 y.o. female who presents today for individual therapy session at [facility] with Riri Rico LCSW.  Patient stated that she recently was diagnosed with ADHD.  Patient stated that she had her medications increased and also started a new medication for the ADHD.  Patient stated that she understands that it takes a little bit to get into her system.  Patient stated that she has felt good the past couple of days.  Patient stated that she is feeling happier and having some more motivation.  Patient stated that she has had an increase in her anxiety in the form of being very jittery but that was explained to her with the start of the new medication.  Patient stated that she does continue to struggle with health anxiety but she was able to talk herself down especially since given explanation before she started the medication.  Patient stated this time she feels very hopeful that things are improving.  Patient discussed relationship with her .  Patient stated that she often feels bad for not having sex with him.  Patient stated that she does not have a sex drive and also understands that is a side effect of her medication.  Patient stated at this time she does not feel very good about herself.  Patient and therapist discussed self-esteem and ways to work on that.      Clinical Maneuvering/Intervention:    (Scales based on 0 - 10 with 10 being the worst)  Depression: 3 Anxiety: 6       Assisted patient in processing above session content; acknowledged and normalized patient’s thoughts, feelings, and concerns.  Rationalized patient thought process regarding anxiety.  Discussed triggers associated with patient's anxiety.  Also discussed coping skills for patient to implement such as self-care.    Allowed patient to freely discuss issues without interruption or judgment. Provided safe,  confidential environment to facilitate the development of positive therapeutic relationship and encourage open, honest communication. Assisted patient in identifying risk factors which would indicate the need for higher level of care including thoughts to harm self or others and/or self-harming behavior and encouraged patient to contact this office, call 911, or present to the nearest emergency room should any of these events occur. Discussed crisis intervention services and means to access. Patient adamantly and convincingly denies current suicidal or homicidal ideation or perceptual disturbance.    Assessment   Patient appears to maintain relative stability as compared to their baseline.  However, patient continues to struggle with anxiety and depression which continues to cause impairment in important areas of functioning.  A result, they can be reasonably expected to continue to benefit from treatment and would likely be at increased risk for decompensation otherwise.    Mental Status Exam:   Hygiene:   good  Cooperation:  Cooperative  Eye Contact:  Good  Psychomotor Behavior:  Appropriate  Affect:  Appropriate  Mood: normal  Speech:  Normal  Thought Process:  Goal directed  Thought Content:  Mood congruent  Suicidal:  None  Homicidal:  None  Hallucinations:  None  Delusion:  None  Memory:  Intact  Orientation:  Person, Place, Time and Situation  Reliability:  fair  Insight:  Fair  Judgement:  Fair  Impulse Control:  Fair  Physical/Medical Issues:  No        Patient's Support Network Includes:   and extended family    Functional Status: Mild impairment     Progress toward goal: Not at goal    Prognosis: Fair with Ongoing Treatment          Plan     Patient will make a plan and work towards increasing self-esteem.  Patient will continue in individual outpatient therapy with focus on improved functioning and coping skills, maintaining stability, and avoiding decompensation and the need for higher level of  care.    Patient will adhere to medication regimen as prescribed and report any side effects. Patient will contact this office, call 911 or present to the nearest emergency room should suicidal or homicidal ideations occur. Provide Cognitive Behavioral Therapy and Solution Focused Therapy to improve functioning, maintain stability, and avoid decompensation and the need for higher level of care.     Return in about 2 weeks, or earlier if symptoms worsen or fail to improve.           VISIT DIAGNOSIS:     ICD-10-CM ICD-9-CM   1. Current severe episode of major depressive disorder without psychotic features without prior episode ([facility])  F32.2 296.23   2. Generalized anxiety disorder  F41.1 300.02   3. ADHD (attention deficit hyperactivity disorder), inattentive type  F90.0 314.00        This document has been electronically signed by Riri Rico LCSW, November 9, 2021, 11:09 EST    Part of this note may be an electronic transcription/translation of spoken language to printed text using the Dragon Dictation System.

## 2021-12-02 ENCOUNTER — OFFICE VISIT (OUTPATIENT)
Dept: PSYCHIATRY | Facility: CLINIC | Age: 36
End: 2021-12-02

## 2021-12-02 VITALS
OXYGEN SATURATION: 98 % | HEIGHT: 60 IN | HEART RATE: 111 BPM | TEMPERATURE: 97.3 F | DIASTOLIC BLOOD PRESSURE: 86 MMHG | WEIGHT: 169.8 LBS | SYSTOLIC BLOOD PRESSURE: 117 MMHG | BODY MASS INDEX: 33.34 KG/M2

## 2021-12-02 DIAGNOSIS — F33.41 RECURRENT MAJOR DEPRESSIVE DISORDER, IN PARTIAL REMISSION (HCC): ICD-10-CM

## 2021-12-02 DIAGNOSIS — F41.1 GENERALIZED ANXIETY DISORDER: ICD-10-CM

## 2021-12-02 DIAGNOSIS — F90.0 ADHD (ATTENTION DEFICIT HYPERACTIVITY DISORDER), INATTENTIVE TYPE: Primary | ICD-10-CM

## 2021-12-02 PROCEDURE — 99213 OFFICE O/P EST LOW 20 MIN: CPT

## 2021-12-02 RX ORDER — BUPROPION HYDROCHLORIDE 300 MG/1
300 TABLET ORAL EVERY MORNING
Qty: 30 TABLET | Refills: 0 | Status: SHIPPED | OUTPATIENT
Start: 2021-12-02 | End: 2022-01-06 | Stop reason: SDUPTHER

## 2021-12-02 RX ORDER — SERTRALINE HYDROCHLORIDE 100 MG/1
150 TABLET, FILM COATED ORAL DAILY
Qty: 45 TABLET | Refills: 0 | Status: SHIPPED | OUTPATIENT
Start: 2021-12-02 | End: 2022-01-06 | Stop reason: SDUPTHER

## 2021-12-02 NOTE — PROGRESS NOTES
"      Subjective   Kourtney Velasquez is a 36 y.o. female is here today for medication management follow-up.    Chief Complaint: ADHD, depression, anxiety    History of Present Illness: Patient states that the changes in medication has been very beneficial for her.  She states that she starting to feel more like herself.  She has tolerated medication changes without any negative side effects.  Patient states that she has been experienced an increase in energy, increase in motivation, decrease in sadness, decreased since crying spells, decreased fatigue she has began \"going to work consistently.\"  She identifies that she she denies helplessness and hopelessness.  He has noted a decrease in irritability than what was previously reported.  She states her depression is a 1-2 on a scale of 0-10 with 10 being the worst.  She states that she is doing way better than what she was before.She identifies that her anxiety is a 3 on a scale of 0-10 with 10 being the worst.  She endorses a consistent sleep pattern averaging 8 hours per night without intermittent awakenings or difficulty falling asleep.  She identifies that there is been no changes in her appetite with no major weight fluctuations. Body mass index is 33.16 kg/m². She states that she is still worrying about task that she cannot get completed.  She says that when she is at work she finds her self starting many task and having difficulty returning to them.  She states that this makes her day at work difficult as \"she is afraid she is going to forget very important tasks that need to be completed.\"  She states that she is cleaning in spurts at home however still struggling to get task completed there as well.  She has obsessions and compulsions.  She denies symptomology associated with PTSD or gilda and/or hypomania.  She denies AVH.  She denies Sh.  She denies SI and HI.    The following portions of the patient's history were reviewed and updated as appropriate: " allergies, current medications, past family history, past medical history, past social history, past surgical history and problem list.    Review of Systems   Constitutional: Positive for activity change. Negative for appetite change and fatigue.   HENT: Negative for drooling and tinnitus.    Eyes: Negative for photophobia and visual disturbance.   Respiratory: Negative for chest tightness and shortness of breath.    Cardiovascular: Negative for chest pain and palpitations.   Gastrointestinal: Negative for abdominal pain, diarrhea and vomiting.   Endocrine: Negative for polydipsia and polyuria.   Genitourinary: Negative for dysuria, frequency and urgency.   Musculoskeletal: Negative for gait problem and joint swelling.   Skin: Negative for pallor and rash.   Allergic/Immunologic: Negative for environmental allergies and food allergies.   Neurological: Negative for dizziness, tremors, seizures, syncope, facial asymmetry, speech difficulty, weakness, light-headedness, numbness and headaches.   Hematological: Negative for adenopathy. Does not bruise/bleed easily.   Psychiatric/Behavioral: Positive for decreased concentration. Negative for agitation, behavioral problems, confusion, dysphoric mood, hallucinations, self-injury, sleep disturbance and suicidal ideas. The patient is nervous/anxious. The patient is not hyperactive.        Objective   Physical Exam  Vitals reviewed.   Constitutional:       Appearance: Normal appearance. She is normal weight.   HENT:      Head: Normocephalic.      Nose: Nose normal.      Mouth/Throat:      Mouth: Mucous membranes are moist.      Pharynx: Oropharynx is clear.   Eyes:      Extraocular Movements: Extraocular movements intact.      Pupils: Pupils are equal, round, and reactive to light.   Cardiovascular:      Rate and Rhythm: Normal rate.   Pulmonary:      Effort: Pulmonary effort is normal.   Abdominal:      General: Abdomen is flat.   Musculoskeletal:         General: Normal  "range of motion.      Cervical back: Normal range of motion.   Skin:     General: Skin is warm and dry.   Neurological:      General: No focal deficit present.      Mental Status: She is alert and oriented to person, place, and time. Mental status is at baseline.   Psychiatric:         Attention and Perception: Attention and perception normal.         Mood and Affect: Mood and affect normal.         Speech: Speech normal.         Behavior: Behavior normal.         Thought Content: Thought content normal.         Cognition and Memory: Cognition and memory normal.         Judgment: Judgment normal.       Blood pressure 117/86, pulse 111, temperature 97.3 °F (36.3 °C), height 152.4 cm (60\"), weight 77 kg (169 lb 12.8 oz), SpO2 98 %, not currently breastfeeding.    Medication List:   Current Outpatient Medications   Medication Sig Dispense Refill   • albuterol sulfate  (90 Base) MCG/ACT inhaler Inhale 2 puffs Every 4 (Four) Hours As Needed for Wheezing. 18 g 2   • buPROPion XL (Wellbutrin XL) 300 MG 24 hr tablet Take 1 tablet by mouth Every Morning for 30 days. 30 tablet 0   • cetirizine (zyrTEC) 10 MG tablet Take 10 mg by mouth Daily.     • levothyroxine (SYNTHROID, LEVOTHROID) 112 MCG tablet Take 1 tablet by mouth Daily. 90 tablet 2   • Magnesium Gluconate (MAGNESIUM 27 PO) Take  by mouth.     • prenatal vitamin (prenatal, CLASSIC, vitamin) tablet Take  by mouth Daily.     • sertraline (Zoloft) 100 MG tablet Take 1.5 tablets by mouth Daily. 45 tablet 0     No current facility-administered medications for this visit.       Mental Status Exam:   Hygiene:   good  Cooperation:  Cooperative  Eye Contact:  Good  Psychomotor Behavior:  Appropriate  Affect:  Full range  Hopelessness: Denies  Speech:  Normal  Thought Process:  Goal directed  Thought Content:  Normal  Suicidal:  None  Homicidal:  None  Hallucinations:  None  Delusion:  None  Memory:  Intact  Orientation:  Person, Place, Time and Situation  Reliability:  " good  Insight:  Good  Judgement:  Good  Impulse Control:  Good  Physical/Medical Issues:  No       PHQ-2 Depression Screening  Little interest or pleasure in doing things? 1   Feeling down, depressed, or hopeless? 0   PHQ-2 Total Score 1       YADY-7  Feeling nervous, anxious or on edge: Several days  Not being able to stop or control worrying: Several days  Worrying too much about different things: Several days  Trouble Relaxing: Several days  Being so restless that it is hard to sit still: Several days  Feeling afraid as if something awful might happen: Several days  Becoming easily annoyed or irritable: Several days  YADY 7 Total Score: 7    Kourtney Velasquez  reports that she has never smoked. She has never used smokeless tobacco.     Prognosis: Good dependent on medication/follow up and treatment plan compliance.  Functional Status: slight impairment in areas of daily functioning.      Assessment/Plan   Problems Addressed this Visit     None      Visit Diagnoses     ADHD (attention deficit hyperactivity disorder), inattentive type    -  Primary    Relevant Medications    buPROPion XL (Wellbutrin XL) 300 MG 24 hr tablet    sertraline (Zoloft) 100 MG tablet    Recurrent major depressive disorder, in partial remission (HCC)        Relevant Medications    buPROPion XL (Wellbutrin XL) 300 MG 24 hr tablet    sertraline (Zoloft) 100 MG tablet    Generalized anxiety disorder        Relevant Medications    buPROPion XL (Wellbutrin XL) 300 MG 24 hr tablet    sertraline (Zoloft) 100 MG tablet      Diagnoses       Codes Comments    ADHD (attention deficit hyperactivity disorder), inattentive type    -  Primary ICD-10-CM: F90.0  ICD-9-CM: 314.00     Recurrent major depressive disorder, in partial remission (HCC)     ICD-10-CM: F33.41  ICD-9-CM: 296.35     Generalized anxiety disorder     ICD-10-CM: F41.1  ICD-9-CM: 300.02         -Recent UDS reviewed and negative.  -Continue Zoloft 150 mg p.o. daily for anxiety and  depression.  -Increase Wellbutrin XL to 300 mg p.o. daily for ADHD and depression.  -Discontinue propranolol 10 mg p.o. twice daily as there has been no complaints of panic.  -Continue psychotherapy with LCSW.  -Medications and pharmacy at this time.    Discussed medication options.  I've explained to her that drugs of the SSRI class can have side effects such as weight gain, sexual dysfunction, insomnia, headache, nausea. These medications are generally effective at alleviating symptoms of anxiety and/or depression. Let me know if significant side effects do occur.  Reviewed the risks, benefits, and side effects of the medications; patient acknowledged and verbally consented.  Patient is agreeable to call the [facility].  Patient is aware to call 911 or go to the nearest ER should begin having SI/HI.        Return in about 4 weeks (around 12/30/2021), or if symptoms worsen or fail to improve, for Next scheduled follow up.    SUPPORTIVE PSYCHOTHERAPY: continuing efforts to promote the therapeutic alliance, address the patient’s issues, and strengthen self awareness, insights, and coping skills.  Assisted patient in processing above session content; acknowledged and normalized patient’s thoughts, feelings, and concerns.  Applied  positive coping skills and behavior management in session.Allowed patient to freely discuss issues without interruption or judgment. Provided safe, confidential environment to facilitate the development of positive therapeutic relationship and encourage open, honest communication. Assisted patient in identifying risk factors which would indicate the need for higher level of care including thoughts to harm self or others and/or self-harming behavior and encouraged patient to contact this office, call 911, or present to the nearest emergency room should any of these events occur. Discussed crisis intervention services and means to access.  Patient adamantly and convincingly denies current  suicidal or homicidal ideation or perceptual disturbance.      This document has been electronically signed by DANG East  December 2, 2021 18:48 EST   Errors in dictation may reflect use of voice recognition software and not all errors in transcription may have been detected prior to signing.

## 2021-12-07 ENCOUNTER — OFFICE VISIT (OUTPATIENT)
Dept: PSYCHIATRY | Facility: CLINIC | Age: 36
End: 2021-12-07

## 2021-12-07 DIAGNOSIS — F41.1 GENERALIZED ANXIETY DISORDER: Primary | ICD-10-CM

## 2021-12-07 DIAGNOSIS — F33.0 MDD (MAJOR DEPRESSIVE DISORDER), RECURRENT EPISODE, MILD (HCC): ICD-10-CM

## 2021-12-07 DIAGNOSIS — F90.0 ADHD (ATTENTION DEFICIT HYPERACTIVITY DISORDER), INATTENTIVE TYPE: ICD-10-CM

## 2021-12-07 PROCEDURE — 90837 PSYTX W PT 60 MINUTES: CPT | Performed by: SOCIAL WORKER

## 2021-12-07 NOTE — PROGRESS NOTES
"Date: December 7, 2021  Time In: 9:00 am  Time Out: 10:00 am      PROGRESS NOTE  Data:  Kourtney Velasquez is a 36 y.o. female who presents today for individual therapy session at [facility] with Riri Rico LCSW. Pt stated that she recently had an increase in her Wellbutrin and has not started it yet. Pt stated that she is having a lot of anxiety related to the increase in medication. Pt stated that she is having \"weird anxiety\". Pt stated that she is feeling uncomfortable, has a feeling that something bad is going to happen, and having increase in nervousness. Pt stated that she is unsure why she is having an increase in anxiety. Pt stated that she tolerated the medication well originally for about 4 weeks before increase. Pt stated that her and her  have both been sick for the past couple weeks. Pt stated that sickness can increase her health anxiety. Pt stated that there are things that she wants to complete in her house that she does not have the motivation to do. Pt stated that she also worries that she will start a project and not finish it and it will be a mess. Pt stated that right now the thing she wants to do the most is organize her pantry. Pt stated that she has wanted to do it for a while now, but has continued to put it off. Pt and therapist discussed noticing differences between anxiety, depression, and ADHD. Pt stated that she is unsure at this time how her symptoms differ. Pt stated that she often is easily overwhelmed. Pt stated that she wants to take the increased medication. Pt encouraged to contact office if she does have a reaction to medication, which helped to decrease some of her fear and anxiety.       Clinical Maneuvering/Intervention:    (Scales based on 0 - 10 with 10 being the worst)  Depression: 3 Anxiety: 7       Assisted patient in processing above session content; acknowledged and normalized patient’s thoughts, feelings, and concerns.  Rationalized " patient thought process regarding anxiety.  Discussed triggers associated with patient's anxiety.  Also discussed coping skills for patient to implement such as self care.    Allowed patient to freely discuss issues without interruption or judgment. Provided safe, confidential environment to facilitate the development of positive therapeutic relationship and encourage open, honest communication. Assisted patient in identifying risk factors which would indicate the need for higher level of care including thoughts to harm self or others and/or self-harming behavior and encouraged patient to contact this office, call 911, or present to the nearest emergency room should any of these events occur. Discussed crisis intervention services and means to access. Patient adamantly and convincingly denies current suicidal or homicidal ideation or perceptual disturbance.    Assessment   Patient appears to maintain relative stability as compared to their baseline.  However, patient continues to struggle with anxiety which continues to cause impairment in important areas of functioning.  A result, they can be reasonably expected to continue to benefit from treatment and would likely be at increased risk for decompensation otherwise.    Mental Status Exam:   Hygiene:   good  Cooperation:  Cooperative  Eye Contact:  Good  Psychomotor Behavior:  Appropriate  Affect:  Appropriate  Mood: anxious  Speech:  Normal  Thought Process:  Goal directed  Thought Content:  Mood congruent  Suicidal:  None  Homicidal:  None  Hallucinations:  None  Delusion:  None  Memory:  Intact  Orientation:  Person, Place, Time and Situation  Reliability:  fair  Insight:  Fair  Judgement:  Fair  Impulse Control:  Fair  Physical/Medical Issues:  No        Patient's Support Network Includes:   and extended family    Functional Status: Mild impairment     Progress toward goal: Not at goal    Prognosis: Fair with Ongoing Treatment          Plan     Patient  will continue in individual outpatient therapy with focus on improved functioning and coping skills, maintaining stability, and avoiding decompensation and the need for higher level of care.    Patient will adhere to medication regimen as prescribed and report any side effects. Patient will contact this office, call 911 or present to the nearest emergency room should suicidal or homicidal ideations occur. Provide Cognitive Behavioral Therapy and Solution Focused Therapy to improve functioning, maintain stability, and avoid decompensation and the need for higher level of care.     Return in about 2 weeks, or earlier if symptoms worsen or fail to improve.           VISIT DIAGNOSIS:     ICD-10-CM ICD-9-CM   1. Generalized anxiety disorder  F41.1 300.02   2. MDD (major depressive disorder), recurrent episode, mild (HCC)  F33.0 296.31   3. ADHD (attention deficit hyperactivity disorder), inattentive type  F90.0 314.00        This document has been electronically signed by Riri Rico LCSW, December 7, 2021, 11:32 EST    Part of this note may be an electronic transcription/translation of spoken language to printed text using the Dragon Dictation System.

## 2021-12-20 ENCOUNTER — OFFICE VISIT (OUTPATIENT)
Dept: FAMILY MEDICINE CLINIC | Facility: CLINIC | Age: 36
End: 2021-12-20

## 2021-12-20 VITALS
SYSTOLIC BLOOD PRESSURE: 110 MMHG | OXYGEN SATURATION: 99 % | BODY MASS INDEX: 33.26 KG/M2 | WEIGHT: 169.4 LBS | DIASTOLIC BLOOD PRESSURE: 74 MMHG | HEART RATE: 102 BPM | HEIGHT: 60 IN | TEMPERATURE: 97.5 F

## 2021-12-20 DIAGNOSIS — J45.20 MILD INTERMITTENT EXTRINSIC ASTHMA WITHOUT COMPLICATION: ICD-10-CM

## 2021-12-20 DIAGNOSIS — E03.9 ACQUIRED HYPOTHYROIDISM: Primary | ICD-10-CM

## 2021-12-20 DIAGNOSIS — R05.3 CHRONIC COUGH: ICD-10-CM

## 2021-12-20 PROCEDURE — 36415 COLL VENOUS BLD VENIPUNCTURE: CPT | Performed by: FAMILY MEDICINE

## 2021-12-20 PROCEDURE — 84443 ASSAY THYROID STIM HORMONE: CPT | Performed by: FAMILY MEDICINE

## 2021-12-20 PROCEDURE — 85025 COMPLETE CBC W/AUTO DIFF WBC: CPT | Performed by: FAMILY MEDICINE

## 2021-12-20 PROCEDURE — 99214 OFFICE O/P EST MOD 30 MIN: CPT | Performed by: FAMILY MEDICINE

## 2021-12-20 PROCEDURE — 80053 COMPREHEN METABOLIC PANEL: CPT | Performed by: FAMILY MEDICINE

## 2021-12-20 RX ORDER — CETIRIZINE HYDROCHLORIDE 10 MG/1
10 TABLET ORAL DAILY
Qty: 90 TABLET | Refills: 3 | Status: SHIPPED | OUTPATIENT
Start: 2021-12-20 | End: 2022-09-20 | Stop reason: SDUPTHER

## 2021-12-20 RX ORDER — LEVOTHYROXINE SODIUM 112 UG/1
112 TABLET ORAL DAILY
Qty: 90 TABLET | Refills: 2 | Status: SHIPPED | OUTPATIENT
Start: 2021-12-20 | End: 2021-12-22 | Stop reason: SDUPTHER

## 2021-12-20 RX ORDER — ALBUTEROL SULFATE 90 UG/1
2 AEROSOL, METERED RESPIRATORY (INHALATION) EVERY 4 HOURS PRN
Qty: 18 G | Refills: 2 | Status: SHIPPED | OUTPATIENT
Start: 2021-12-20 | End: 2022-09-20 | Stop reason: SDUPTHER

## 2021-12-20 NOTE — PROGRESS NOTES
Subjective   Kourtney Velasquez is a 36 y.o. female.   Pt presents today with CC of Depression      History of Present Illness   Patient is a pleasant 36-year-old female who has anxiety and depression.  She follows with psychiatry and a psychologist.  She has no concerns with these at this time.  #2 she has seasonal allergies for which she takes Zyrtec and takes albuterol occasionally for associated asthma.  She needs refill.  #3 she has hypothyroidism for which she takes levothyroxine 112 mcg daily.  She is agreeable to blood work today.  Her last TSH was normal.           The following portions of the patient's history were reviewed and updated as appropriate: allergies, current medications, past family history, past medical history, past social history, past surgical history and problem list.    Review of Systems   Constitutional: Negative for chills, fever and unexpected weight loss.   HENT: Negative for congestion and sore throat.    Eyes: Negative for blurred vision and visual disturbance.   Respiratory: Negative for cough and wheezing.    Cardiovascular: Negative for chest pain and palpitations.   Gastrointestinal: Negative for abdominal pain and diarrhea.   Endocrine: Negative for cold intolerance and heat intolerance.   Genitourinary: Negative for dysuria.   Musculoskeletal: Negative for arthralgias and neck stiffness.   Neurological: Negative for dizziness, seizures and syncope.   Psychiatric/Behavioral: Negative for self-injury, suicidal ideas and depressed mood.       Objective   Physical Exam  Vitals and nursing note reviewed.   Constitutional:       Appearance: She is well-developed.   HENT:      Head: Normocephalic and atraumatic.      Right Ear: External ear normal.      Left Ear: External ear normal.      Nose: Nose normal.   Eyes:      Conjunctiva/sclera: Conjunctivae normal.      Pupils: Pupils are equal, round, and reactive to light.   Cardiovascular:      Rate and Rhythm: Normal rate and regular  rhythm.      Heart sounds: Normal heart sounds.   Pulmonary:      Effort: Pulmonary effort is normal.      Breath sounds: Normal breath sounds.   Abdominal:      General: Bowel sounds are normal.      Palpations: Abdomen is soft.   Musculoskeletal:      Cervical back: Normal range of motion and neck supple.   Skin:     General: Skin is warm and dry.   Neurological:      Mental Status: She is alert and oriented to person, place, and time.   Psychiatric:         Behavior: Behavior normal.           Assessment/Plan   Diagnoses and all orders for this visit:    1. Acquired hypothyroidism (Primary)  -     Comprehensive Metabolic Panel; Future  -     CBC Auto Differential; Future  -     TSH; Future  -     levothyroxine (SYNTHROID, LEVOTHROID) 112 MCG tablet; Take 1 tablet by mouth Daily.  Dispense: 90 tablet; Refill: 2  -     Comprehensive Metabolic Panel  -     CBC Auto Differential  -     TSH  Levothyroxine refilled.  We will check blood work today to ensure no problems.  She has been on a couple different anxiety/depression medications.  We will also get blood work to ensure no electrolyte abnormalities.  2. Chronic cough  -     albuterol sulfate  (90 Base) MCG/ACT inhaler; Inhale 2 puffs Every 4 (Four) Hours As Needed for Wheezing.  Dispense: 18 g; Refill: 2    3. Mild intermittent extrinsic asthma without complication  -     albuterol sulfate  (90 Base) MCG/ACT inhaler; Inhale 2 puffs Every 4 (Four) Hours As Needed for Wheezing.  Dispense: 18 g; Refill: 2    Other orders  -     cetirizine (zyrTEC) 10 MG tablet; Take 1 tablet by mouth Daily.  Dispense: 90 tablet; Refill: 3                      Patient's Body mass index is 33.08 kg/m². indicating that she is obese (BMI >30). Obesity-related health conditions include the following: none. Obesity is unchanged. BMI is is above average; BMI management plan is completed. We discussed portion control and increasing exercise..

## 2021-12-21 LAB
ALBUMIN SERPL-MCNC: 4.7 G/DL (ref 3.5–5.2)
ALBUMIN/GLOB SERPL: 1.7 G/DL
ALP SERPL-CCNC: 93 U/L (ref 39–117)
ALT SERPL W P-5'-P-CCNC: 16 U/L (ref 1–33)
ANION GAP SERPL CALCULATED.3IONS-SCNC: 7.8 MMOL/L (ref 5–15)
AST SERPL-CCNC: 17 U/L (ref 1–32)
BASOPHILS # BLD AUTO: 0.07 10*3/MM3 (ref 0–0.2)
BASOPHILS NFR BLD AUTO: 0.8 % (ref 0–1.5)
BILIRUB SERPL-MCNC: 0.3 MG/DL (ref 0–1.2)
BUN SERPL-MCNC: 11 MG/DL (ref 6–20)
BUN/CREAT SERPL: 14.7 (ref 7–25)
CALCIUM SPEC-SCNC: 9.7 MG/DL (ref 8.6–10.5)
CHLORIDE SERPL-SCNC: 102 MMOL/L (ref 98–107)
CO2 SERPL-SCNC: 25.2 MMOL/L (ref 22–29)
CREAT SERPL-MCNC: 0.75 MG/DL (ref 0.57–1)
DEPRECATED RDW RBC AUTO: 40.3 FL (ref 37–54)
EOSINOPHIL # BLD AUTO: 0.21 10*3/MM3 (ref 0–0.4)
EOSINOPHIL NFR BLD AUTO: 2.3 % (ref 0.3–6.2)
ERYTHROCYTE [DISTWIDTH] IN BLOOD BY AUTOMATED COUNT: 12.2 % (ref 12.3–15.4)
GFR SERPL CREATININE-BSD FRML MDRD: 88 ML/MIN/1.73
GLOBULIN UR ELPH-MCNC: 2.8 GM/DL
GLUCOSE SERPL-MCNC: 83 MG/DL (ref 65–99)
HCT VFR BLD AUTO: 42.1 % (ref 34–46.6)
HGB BLD-MCNC: 13.8 G/DL (ref 12–15.9)
IMM GRANULOCYTES # BLD AUTO: 0.02 10*3/MM3 (ref 0–0.05)
IMM GRANULOCYTES NFR BLD AUTO: 0.2 % (ref 0–0.5)
LYMPHOCYTES # BLD AUTO: 2.11 10*3/MM3 (ref 0.7–3.1)
LYMPHOCYTES NFR BLD AUTO: 22.7 % (ref 19.6–45.3)
MCH RBC QN AUTO: 29.2 PG (ref 26.6–33)
MCHC RBC AUTO-ENTMCNC: 32.8 G/DL (ref 31.5–35.7)
MCV RBC AUTO: 89.2 FL (ref 79–97)
MONOCYTES # BLD AUTO: 0.63 10*3/MM3 (ref 0.1–0.9)
MONOCYTES NFR BLD AUTO: 6.8 % (ref 5–12)
NEUTROPHILS NFR BLD AUTO: 6.27 10*3/MM3 (ref 1.7–7)
NEUTROPHILS NFR BLD AUTO: 67.2 % (ref 42.7–76)
NRBC BLD AUTO-RTO: 0 /100 WBC (ref 0–0.2)
PLATELET # BLD AUTO: 352 10*3/MM3 (ref 140–450)
PMV BLD AUTO: 10.9 FL (ref 6–12)
POTASSIUM SERPL-SCNC: 4 MMOL/L (ref 3.5–5.2)
PROT SERPL-MCNC: 7.5 G/DL (ref 6–8.5)
RBC # BLD AUTO: 4.72 10*6/MM3 (ref 3.77–5.28)
SODIUM SERPL-SCNC: 135 MMOL/L (ref 136–145)
TSH SERPL DL<=0.05 MIU/L-ACNC: 4.52 UIU/ML (ref 0.27–4.2)
WBC NRBC COR # BLD: 9.31 10*3/MM3 (ref 3.4–10.8)

## 2021-12-22 ENCOUNTER — TELEPHONE (OUTPATIENT)
Dept: FAMILY MEDICINE CLINIC | Facility: CLINIC | Age: 36
End: 2021-12-22

## 2021-12-22 DIAGNOSIS — E03.9 ACQUIRED HYPOTHYROIDISM: ICD-10-CM

## 2021-12-22 RX ORDER — LEVOTHYROXINE SODIUM 0.12 MG/1
125 TABLET ORAL DAILY
Qty: 90 TABLET | Refills: 1 | Status: SHIPPED | OUTPATIENT
Start: 2021-12-22 | End: 2022-09-20 | Stop reason: SDUPTHER

## 2021-12-23 ENCOUNTER — TELEPHONE (OUTPATIENT)
Dept: FAMILY MEDICINE CLINIC | Facility: CLINIC | Age: 36
End: 2021-12-23

## 2021-12-27 ENCOUNTER — TELEPHONE (OUTPATIENT)
Dept: FAMILY MEDICINE CLINIC | Facility: CLINIC | Age: 36
End: 2021-12-27

## 2021-12-27 NOTE — TELEPHONE ENCOUNTER
----- Message from Tiburcio Larson DO sent at 12/22/2021  8:54 AM EST -----  Your thyroid is underfunctioning just a little.  You currently take 112 mcg daily, I recommend increasing to 125 mcg daily.  And we will recheck next time.  TSH was 4.5.      Patient notified & verbalized understanding.

## 2022-01-06 DIAGNOSIS — F33.41 RECURRENT MAJOR DEPRESSIVE DISORDER, IN PARTIAL REMISSION: ICD-10-CM

## 2022-01-06 DIAGNOSIS — F90.0 ADHD (ATTENTION DEFICIT HYPERACTIVITY DISORDER), INATTENTIVE TYPE: ICD-10-CM

## 2022-01-06 DIAGNOSIS — F41.1 GENERALIZED ANXIETY DISORDER: ICD-10-CM

## 2022-01-06 RX ORDER — BUPROPION HYDROCHLORIDE 300 MG/1
300 TABLET ORAL EVERY MORNING
Qty: 30 TABLET | Refills: 0 | Status: SHIPPED | OUTPATIENT
Start: 2022-01-06 | End: 2022-02-23

## 2022-01-06 RX ORDER — SERTRALINE HYDROCHLORIDE 100 MG/1
150 TABLET, FILM COATED ORAL DAILY
Qty: 45 TABLET | Refills: 0 | Status: SHIPPED | OUTPATIENT
Start: 2022-01-06 | End: 2022-02-15

## 2022-01-25 ENCOUNTER — OFFICE VISIT (OUTPATIENT)
Dept: PSYCHIATRY | Facility: CLINIC | Age: 37
End: 2022-01-25

## 2022-01-25 VITALS
HEIGHT: 60 IN | OXYGEN SATURATION: 98 % | DIASTOLIC BLOOD PRESSURE: 68 MMHG | SYSTOLIC BLOOD PRESSURE: 125 MMHG | WEIGHT: 169.6 LBS | BODY MASS INDEX: 33.3 KG/M2 | TEMPERATURE: 97.1 F

## 2022-01-25 DIAGNOSIS — F41.1 GENERALIZED ANXIETY DISORDER: ICD-10-CM

## 2022-01-25 DIAGNOSIS — F90.0 ADHD (ATTENTION DEFICIT HYPERACTIVITY DISORDER), INATTENTIVE TYPE: Primary | ICD-10-CM

## 2022-01-25 DIAGNOSIS — F33.41 RECURRENT MAJOR DEPRESSIVE DISORDER, IN PARTIAL REMISSION: ICD-10-CM

## 2022-01-25 PROCEDURE — 99214 OFFICE O/P EST MOD 30 MIN: CPT

## 2022-01-25 RX ORDER — METHYLPHENIDATE HYDROCHLORIDE 18 MG/1
18 TABLET ORAL DAILY
Qty: 30 TABLET | Refills: 0 | Status: SHIPPED | OUTPATIENT
Start: 2022-01-25 | End: 2022-02-23 | Stop reason: SDUPTHER

## 2022-01-25 NOTE — PROGRESS NOTES
"      Subjective   Kourtney Velasquez is a 36 y.o. female is here today for medication management follow-up.    Chief Complaint: ADHD, depression, anxiety    History of Present Illness:     Patient identifies that they recently increased her levothyroxine dosage related to lab results. She never negative side effects associated with dose increase. She reports that her and her family have recently recovered from COVID and denies complications.Patient identifies that she did not like the way that Wellbutrin was making her feel. She identifies that she feels more like herself since being off it; she was unable to specifically identify notable negative side effects. She denies any side effects with current Zoloft dosing. She does report worsening in depressive and anxiety symptomology. She identifies that she believes that is worsening related to worsening of her ADHD symptomology. She identifies that she \"gets angry\" at herself related to inability to initate and complete a task. She identifies that is becoming more prominent. 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 This behavior has led to her spending more days at home and missing work as it increases her depression symptoms because \"I already know how my day will go.\" She reports disorganization and forgetfulness at work.She identifies continued difficulty at home as she states, \"I struggle to get everything done at home that I need to because I get distracted, lose track of time, or never complete it. She identifies that she has noted these behaviors negatively impacting her mood as she feels guilt and apprehension related to these scenarios. She rates her depression as 5/10 and her anxiety as 6/10. She endorses a consistent sleep " pattern averaging 8 hours per night without intermittent awakenings or difficulty falling asleep.  She identifies that there is been no changes in her appetite with no major weight fluctuations.Body mass index is 33.12 kg/m². She denies obsessions and compulsions.  She denies symptomology associated with PTSD or gilda and/or hypomania.  She denies AVH.  She denies Sh.  She denies SI and HI.     The following portions of the patient's history were reviewed and updated as appropriate: allergies, current medications, past family history, past medical history, past social history, past surgical history and problem list.    Review of Systems   Constitutional: Positive for activity change. Negative for appetite change and fatigue.   HENT: Negative for drooling and tinnitus.    Eyes: Negative for photophobia and visual disturbance.   Respiratory: Negative for chest tightness and shortness of breath.    Cardiovascular: Negative for chest pain and palpitations.   Gastrointestinal: Negative for abdominal pain, diarrhea and vomiting.   Endocrine: Negative for polydipsia and polyuria.   Genitourinary: Negative for dysuria, frequency and urgency.   Musculoskeletal: Negative for gait problem and joint swelling.   Skin: Negative for pallor and rash.   Allergic/Immunologic: Negative for environmental allergies and food allergies.   Neurological: Negative for dizziness, tremors, seizures, syncope, facial asymmetry, speech difficulty, weakness, light-headedness, numbness and headaches.   Hematological: Negative for adenopathy. Does not bruise/bleed easily.   Psychiatric/Behavioral: Positive for decreased concentration. Negative for agitation, behavioral problems, confusion, dysphoric mood, hallucinations, self-injury, sleep disturbance and suicidal ideas. The patient is nervous/anxious. The patient is not hyperactive.        Objective   Physical Exam  Vitals reviewed.   Constitutional:       Appearance: Normal appearance. She is  "normal weight.   HENT:      Head: Normocephalic.      Nose: Nose normal.      Mouth/Throat:      Mouth: Mucous membranes are moist.      Pharynx: Oropharynx is clear.   Eyes:      Extraocular Movements: Extraocular movements intact.      Pupils: Pupils are equal, round, and reactive to light.   Cardiovascular:      Rate and Rhythm: Normal rate.   Pulmonary:      Effort: Pulmonary effort is normal.   Abdominal:      General: Abdomen is flat.   Musculoskeletal:         General: Normal range of motion.      Cervical back: Normal range of motion.   Skin:     General: Skin is warm and dry.   Neurological:      General: No focal deficit present.      Mental Status: She is alert and oriented to person, place, and time. Mental status is at baseline.   Psychiatric:         Attention and Perception: Attention and perception normal.         Mood and Affect: Mood and affect normal.         Speech: Speech normal.         Behavior: Behavior normal.         Thought Content: Thought content normal.         Cognition and Memory: Cognition and memory normal.         Judgment: Judgment normal.       Blood pressure 125/68, temperature 97.1 °F (36.2 °C), height 152.4 cm (60\"), weight 76.9 kg (169 lb 9.6 oz), SpO2 98 %, not currently breastfeeding.    Medication List:   Current Outpatient Medications   Medication Sig Dispense Refill   • albuterol sulfate  (90 Base) MCG/ACT inhaler Inhale 2 puffs Every 4 (Four) Hours As Needed for Wheezing. 18 g 2   • buPROPion XL (Wellbutrin XL) 300 MG 24 hr tablet Take 1 tablet by mouth Every Morning for 30 days. 30 tablet 0   • cetirizine (zyrTEC) 10 MG tablet Take 1 tablet by mouth Daily. 90 tablet 3   • levothyroxine (SYNTHROID, LEVOTHROID) 125 MCG tablet Take 1 tablet by mouth Daily. 90 tablet 1   • Magnesium Gluconate (MAGNESIUM 27 PO) Take  by mouth.     • prenatal vitamin (prenatal, CLASSIC, vitamin) tablet Take  by mouth Daily.     • sertraline (Zoloft) 100 MG tablet Take 1.5 tablets by " mouth Daily. 45 tablet 0     No current facility-administered medications for this visit.       Mental Status Exam:   Hygiene:   good  Cooperation:  Cooperative  Eye Contact:  Good  Psychomotor Behavior:  Appropriate  Affect:  Full range  Hopelessness: Denies  Speech:  Normal  Thought Process:  Goal directed  Thought Content:  Normal  Suicidal:  None  Homicidal:  None  Hallucinations:  None  Delusion:  None  Memory:  Intact  Orientation:  Person, Place, Time and Situation  Reliability:  good  Insight:  Good  Judgement:  Good  Impulse Control:  Good  Physical/Medical Issues:  No        Kourtney Velasquez  reports that she has never smoked. She has never used smokeless tobacco.     Prognosis: Good dependent on medication/follow up and treatment plan compliance.  Functional Status: moderate impairment in areas of daily functioning.      Assessment/Plan   Problems Addressed this Visit     None      Diagnoses    None.       -Continue Zoloft 150 mg p.o. daily for anxiety and depression.  -Discontinue Wellbutrin XL as patient identifies that she felt that it was beginning to negatively impact her mood and feels better without it.   -Start concerta 18 mg PO QAM for attention and focus  -JELLY reviewed and appropriate.  -Recent UDS reviewed and appropriate.  -Narcotic agreement thoroughly discussed and signed by patient.   -Continue psychotherapy with LCSW.  -Medications and pharmacy at this time.    Discussed medication options.  I've explained to her that drugs of the SSRI class can have side effects such as weight gain, sexual dysfunction, insomnia, headache, nausea. These medications are generally effective at alleviating symptoms of anxiety and/or depression. Let me know if significant side effects do occur. Patient was instructed to keep medication in secure place.  The use of energy drinks and decongestants while taking the medication was discouraged.  Informed the medication has abuse potential and can be habit forming  "was discussed. The dangers of stimulant use including elevated heart rate and blood pressure was disclosed.   Patient verbalized understanding and wishes to proceed.   Reviewed the risks, benefits, and side effects of the medications; patient acknowledged and verbally consented.  Patient is agreeable to call the [facility].  Patient is aware to call 911 or go to the nearest ER should begin having SI/HI.      Reviewed with patient behavioral interventions that have been shown to be helpful with ADHD behaviors. These include but are not limited to:  Maintaining a daily schedule  Keeping distractions to a minimum  Providing specific and logical places for one to keep items   Setting small, reachable goals   Using charts and checklists to help stay \"on task\"  Limiting choices    SUPPORTIVE PSYCHOTHERAPY: continuing efforts to promote the therapeutic alliance, address the patient’s issues, and strengthen self awareness, insights, and coping skills.  Assisted patient in processing above session content; acknowledged and normalized patient’s thoughts, feelings, and concerns.  Applied  positive coping skills and behavior management in session.Allowed patient to freely discuss issues without interruption or judgment. Provided safe, confidential environment to facilitate the development of positive therapeutic relationship and encourage open, honest communication. Assisted patient in identifying risk factors which would indicate the need for higher level of care including thoughts to harm self or others and/or self-harming behavior and encouraged patient to contact this office, call 911, or present to the nearest emergency room should any of these events occur. Discussed crisis intervention services and means to access.  Patient adamantly and convincingly denies current suicidal or homicidal ideation or perceptual disturbance.      This document has been electronically signed by DANG East  January 25, 2022 " 13:15 EST   Errors in dictation may reflect use of voice recognition software and not all errors in transcription may have been detected prior to signing.

## 2022-02-14 DIAGNOSIS — F41.1 GENERALIZED ANXIETY DISORDER: ICD-10-CM

## 2022-02-14 DIAGNOSIS — F33.41 RECURRENT MAJOR DEPRESSIVE DISORDER, IN PARTIAL REMISSION: ICD-10-CM

## 2022-02-15 RX ORDER — SERTRALINE HYDROCHLORIDE 100 MG/1
TABLET, FILM COATED ORAL
Qty: 45 TABLET | Refills: 0 | Status: SHIPPED | OUTPATIENT
Start: 2022-02-15 | End: 2022-02-23 | Stop reason: SDUPTHER

## 2022-02-23 ENCOUNTER — OFFICE VISIT (OUTPATIENT)
Dept: PSYCHIATRY | Facility: CLINIC | Age: 37
End: 2022-02-23

## 2022-02-23 VITALS
WEIGHT: 168.6 LBS | TEMPERATURE: 96.9 F | OXYGEN SATURATION: 98 % | BODY MASS INDEX: 33.1 KG/M2 | DIASTOLIC BLOOD PRESSURE: 88 MMHG | HEIGHT: 60 IN | HEART RATE: 81 BPM | SYSTOLIC BLOOD PRESSURE: 119 MMHG

## 2022-02-23 DIAGNOSIS — F33.41 RECURRENT MAJOR DEPRESSIVE DISORDER, IN PARTIAL REMISSION: ICD-10-CM

## 2022-02-23 DIAGNOSIS — F41.1 GENERALIZED ANXIETY DISORDER: ICD-10-CM

## 2022-02-23 DIAGNOSIS — F90.0 ADHD (ATTENTION DEFICIT HYPERACTIVITY DISORDER), INATTENTIVE TYPE: ICD-10-CM

## 2022-02-23 PROCEDURE — 99214 OFFICE O/P EST MOD 30 MIN: CPT

## 2022-02-23 RX ORDER — SERTRALINE HYDROCHLORIDE 100 MG/1
200 TABLET, FILM COATED ORAL DAILY
Qty: 60 TABLET | Refills: 0 | Status: SHIPPED | OUTPATIENT
Start: 2022-02-23 | End: 2022-03-23 | Stop reason: SDUPTHER

## 2022-02-23 RX ORDER — METHYLPHENIDATE HYDROCHLORIDE 27 MG/1
27 TABLET ORAL DAILY
Qty: 30 TABLET | Refills: 0 | Status: SHIPPED | OUTPATIENT
Start: 2022-02-23 | End: 2022-03-24 | Stop reason: SDUPTHER

## 2022-02-23 NOTE — PROGRESS NOTES
"      Subjective   Kourtney Velasquez is a 36 y.o. female is here today for medication management follow-up.    Chief Complaint: ADHD, depression, anxiety    History of Present Illness:   Patient denies negative side effects associated with current medication regimen.  Patient reports that her anxiety has been relatively stable without worsening since last encounter.  She identifies worsening depressive symptomology that could be influenced by situational stressors.  She continues to report \"squirrel moments,\" that are causing difficulty for task initiation and completion.  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  She continues to report forgetfulness.  She identifies \"when I first started taking Concerta I noticed I was more tired, but I have not been able to notice any improvement in my symptoms, feels like of not taking anything.\"  She continues to report that she believes \"the ADHD is making my depression worse because I get so mad at myself.\" She rates her depression as 6/10 and her anxiety as 4/10. She endorses a consistent sleep pattern averaging 8 hours per night without intermittent awakenings or difficulty falling asleep.  She denies the occurrence of nightmares.  She reports no changes in appetite since last encounter or fluctuations in weight.Body mass index is 32.93 kg/m². She denies obsessions and compulsions.  She denies symptomology associated with PTSD or gilda and/or hypomania.  She denies AVH.  She denies Sh.  She denies SI and HI.     The following portions of the patient's history were reviewed and updated as appropriate: allergies, current medications, past family history, past medical history, past social history, past surgical history and problem list.    Review of Systems   Constitutional: Negative for activity change, appetite change, fatigue and unexpected weight change.   HENT: Negative for drooling and tinnitus.    Eyes: Negative for " photophobia and visual disturbance.   Respiratory: Negative for chest tightness and shortness of breath.    Cardiovascular: Negative for chest pain and palpitations.   Gastrointestinal: Negative for abdominal pain, diarrhea and vomiting.   Endocrine: Negative for polydipsia and polyuria.   Genitourinary: Negative for dysuria, frequency and urgency.   Musculoskeletal: Negative for gait problem and joint swelling.   Skin: Negative for pallor and rash.   Allergic/Immunologic: Negative for environmental allergies and food allergies.   Neurological: Negative for dizziness, tremors, seizures, syncope, facial asymmetry, speech difficulty, weakness, light-headedness, numbness and headaches.   Hematological: Negative for adenopathy. Does not bruise/bleed easily.   Psychiatric/Behavioral: Positive for decreased concentration. Negative for agitation, behavioral problems, confusion, dysphoric mood, hallucinations, self-injury, sleep disturbance and suicidal ideas. The patient is not nervous/anxious and is not hyperactive.        Objective   Physical Exam  Vitals reviewed.   Constitutional:       Appearance: Normal appearance. She is normal weight.   HENT:      Head: Normocephalic.      Nose: Nose normal.      Mouth/Throat:      Mouth: Mucous membranes are moist.      Pharynx: Oropharynx is clear.   Eyes:      Extraocular Movements: Extraocular movements intact.      Pupils: Pupils are equal, round, and reactive to light.   Cardiovascular:      Rate and Rhythm: Normal rate.   Pulmonary:      Effort: Pulmonary effort is normal.   Abdominal:      General: Abdomen is flat.   Musculoskeletal:         General: Normal range of motion.      Cervical back: Normal range of motion.   Skin:     General: Skin is warm and dry.   Neurological:      General: No focal deficit present.      Mental Status: She is alert and oriented to person, place, and time. Mental status is at baseline.   Psychiatric:         Attention and Perception: Attention  "and perception normal.         Mood and Affect: Mood and affect normal.         Speech: Speech normal.         Behavior: Behavior normal.         Thought Content: Thought content normal.         Cognition and Memory: Cognition and memory normal.         Judgment: Judgment normal.       Blood pressure 119/88, pulse 81, temperature 96.9 °F (36.1 °C), temperature source Temporal, height 152.4 cm (60\"), weight 76.5 kg (168 lb 9.6 oz), SpO2 98 %, not currently breastfeeding.    Medication List:   Current Outpatient Medications   Medication Sig Dispense Refill   • albuterol sulfate  (90 Base) MCG/ACT inhaler Inhale 2 puffs Every 4 (Four) Hours As Needed for Wheezing. 18 g 2   • cetirizine (zyrTEC) 10 MG tablet Take 1 tablet by mouth Daily. 90 tablet 3   • levothyroxine (SYNTHROID, LEVOTHROID) 125 MCG tablet Take 1 tablet by mouth Daily. 90 tablet 1   • Magnesium Gluconate (MAGNESIUM 27 PO) Take  by mouth.     • methylphenidate (Concerta) 18 MG CR tablet Take 1 tablet by mouth Daily 30 tablet 0   • prenatal vitamin (prenatal, CLASSIC, vitamin) tablet Take  by mouth Daily.     • sertraline (ZOLOFT) 100 MG tablet Take 2 tablets by mouth Daily. 60 tablet 0     No current facility-administered medications for this visit.       Mental Status Exam:   Hygiene:   good  Cooperation:  Cooperative  Eye Contact:  Good  Psychomotor Behavior:  Appropriate  Affect:  Full range  Hopelessness: Denies  Speech:  Normal  Thought Process:  Goal directed  Thought Content:  Normal  Suicidal:  None  Homicidal:  None  Hallucinations:  None  Delusion:  None  Memory:  Intact  Orientation:  Person, Place, Time and Situation  Reliability:  good  Insight:  Good  Judgement:  Good  Impulse Control:  Good  Physical/Medical Issues:  No        Kourtney Velasquez  reports that she has never smoked. She has never used smokeless tobacco.     Prognosis: Good dependent on medication/follow up and treatment plan compliance.  Functional Status: moderate " impairment in areas of daily functioning.      Assessment/Plan   Problems Addressed this Visit     None      Visit Diagnoses     Recurrent major depressive disorder, in partial remission (HCC)        Relevant Medications    sertraline (ZOLOFT) 100 MG tablet    Generalized anxiety disorder        Relevant Medications    sertraline (ZOLOFT) 100 MG tablet      Diagnoses       Codes Comments    Recurrent major depressive disorder, in partial remission (HCC)     ICD-10-CM: F33.41  ICD-9-CM: 296.35     Generalized anxiety disorder     ICD-10-CM: F41.1  ICD-9-CM: 300.02         -Increase Zoloft to 200 mg daily for anxiety and depression.  -Increase Concerta to 27 mg p.o. every morning for attention and focus  -JELLY reviewed and appropriate.   -Continue psychotherapy with LCSW.  -Medications and pharmacy at this time.    Discussed medication options.  I've explained to her that drugs of the SSRI class can have side effects such as weight gain, sexual dysfunction, insomnia, headache, nausea. These medications are generally effective at alleviating symptoms of anxiety and/or depression. Let me know if significant side effects do occur. Patient was instructed to keep medication in secure place.  The use of energy drinks and decongestants while taking the medication was discouraged.  Informed the medication has abuse potential and can be habit forming was discussed. The dangers of stimulant use including elevated heart rate and blood pressure was disclosed.   Patient verbalized understanding and wishes to proceed.   Reviewed the risks, benefits, and side effects of the medications; patient acknowledged and verbally consented.  Patient is agreeable to call the [facility].  Patient is aware to call 911 or go to the nearest ER should begin having SI/HI.      Reviewed with patient behavioral interventions that have been shown to be helpful with ADHD behaviors. These include but are not limited to:  Maintaining a daily  "schedule  Keeping distractions to a minimum  Providing specific and logical places for one to keep items   Setting small, reachable goals   Using charts and checklists to help stay \"on task\"  Limiting choices    SUPPORTIVE PSYCHOTHERAPY: continuing efforts to promote the therapeutic alliance, address the patient’s issues, and strengthen self awareness, insights, and coping skills.  Assisted patient in processing above session content; acknowledged and normalized patient’s thoughts, feelings, and concerns.  Applied  positive coping skills and behavior management in session.Allowed patient to freely discuss issues without interruption or judgment. Provided safe, confidential environment to facilitate the development of positive therapeutic relationship and encourage open, honest communication. Assisted patient in identifying risk factors which would indicate the need for higher level of care including thoughts to harm self or others and/or self-harming behavior and encouraged patient to contact this office, call 911, or present to the nearest emergency room should any of these events occur. Discussed crisis intervention services and means to access.  Patient adamantly and convincingly denies current suicidal or homicidal ideation or perceptual disturbance.      This document has been electronically signed by DANG East  February 23, 2022 13:50 EST   Errors in dictation may reflect use of voice recognition software and not all errors in transcription may have been detected prior to signing.  "

## 2022-03-23 ENCOUNTER — OFFICE VISIT (OUTPATIENT)
Dept: PSYCHIATRY | Facility: CLINIC | Age: 37
End: 2022-03-23

## 2022-03-23 VITALS
SYSTOLIC BLOOD PRESSURE: 122 MMHG | OXYGEN SATURATION: 96 % | TEMPERATURE: 96.9 F | HEART RATE: 80 BPM | HEIGHT: 60 IN | DIASTOLIC BLOOD PRESSURE: 86 MMHG | BODY MASS INDEX: 33.3 KG/M2 | WEIGHT: 169.6 LBS

## 2022-03-23 DIAGNOSIS — F41.1 GENERALIZED ANXIETY DISORDER: ICD-10-CM

## 2022-03-23 DIAGNOSIS — F90.0 ADHD (ATTENTION DEFICIT HYPERACTIVITY DISORDER), INATTENTIVE TYPE: Primary | ICD-10-CM

## 2022-03-23 DIAGNOSIS — F33.41 RECURRENT MAJOR DEPRESSIVE DISORDER, IN PARTIAL REMISSION: ICD-10-CM

## 2022-03-23 PROCEDURE — 99214 OFFICE O/P EST MOD 30 MIN: CPT

## 2022-03-23 RX ORDER — SERTRALINE HYDROCHLORIDE 100 MG/1
200 TABLET, FILM COATED ORAL DAILY
Qty: 60 TABLET | Refills: 0 | Status: SHIPPED | OUTPATIENT
Start: 2022-03-23 | End: 2022-05-12 | Stop reason: SDUPTHER

## 2022-03-23 NOTE — PROGRESS NOTES
Subjective   Kourtney Velasquez is a 36 y.o. female is here today for medication management follow-up.    Chief Complaint: ADHD, depression, anxiety    History of Present Illness:   Patient denies negative side effects associated with current medication regimen.  Patient reports she has been able to tell more of a difference with attention and focus since last encounter and increasing dose of Concerta.  She reports biggest difference has been noted at home, however is still identifying difficulty within the workplace.  She reports improvement in previously identified situational stressors.  She states that her anxiety and depression have been relatively stable and unchanged.  She places anxiety as a 3 on a scale of 0-10 with 10 being the worst.  She places her depression as a 5 on a scale of 0-10 with 10 being the worst.  She identifies improved organizational skills and decreased forgetfulness.  She does report continued distractibility and difficulty with task completion.  Notifies no change in sleep initiation, duration, quality.  She is averaging 7 to 8 hours of sleep per night without intermittent awakenings or the occurrence of nightmares.  She reports no change in appetite since last encounter averaging 2-3 meals per day. Body mass index is 33.12 kg/m². She denies AVH.  She denies Sh.  She denies SI and HI.     The following portions of the patient's history were reviewed and updated as appropriate: allergies, current medications, past family history, past medical history, past social history, past surgical history and problem list.    Review of Systems   Constitutional: Negative for activity change, appetite change, fatigue and unexpected weight change.   HENT: Negative for drooling and tinnitus.    Eyes: Negative for photophobia and visual disturbance.   Respiratory: Negative for chest tightness and shortness of breath.    Cardiovascular: Negative for chest pain and palpitations.   Gastrointestinal:  Negative for abdominal pain, diarrhea and vomiting.   Endocrine: Negative for polydipsia and polyuria.   Genitourinary: Negative for dysuria, frequency and urgency.   Musculoskeletal: Negative for gait problem and joint swelling.   Skin: Negative for pallor and rash.   Allergic/Immunologic: Negative for environmental allergies and food allergies.   Neurological: Negative for dizziness, tremors, seizures, syncope, facial asymmetry, speech difficulty, weakness, light-headedness, numbness and headaches.   Hematological: Negative for adenopathy. Does not bruise/bleed easily.   Psychiatric/Behavioral: Positive for decreased concentration. Negative for agitation, behavioral problems, confusion, dysphoric mood, hallucinations, self-injury, sleep disturbance and suicidal ideas. The patient is not nervous/anxious and is not hyperactive.        Objective   Physical Exam  Vitals reviewed.   Constitutional:       Appearance: Normal appearance. She is normal weight.   HENT:      Head: Normocephalic.      Nose: Nose normal.      Mouth/Throat:      Mouth: Mucous membranes are moist.      Pharynx: Oropharynx is clear.   Eyes:      Extraocular Movements: Extraocular movements intact.      Pupils: Pupils are equal, round, and reactive to light.   Cardiovascular:      Rate and Rhythm: Normal rate.   Pulmonary:      Effort: Pulmonary effort is normal.   Abdominal:      General: Abdomen is flat.   Musculoskeletal:         General: Normal range of motion.      Cervical back: Normal range of motion.   Skin:     General: Skin is warm and dry.   Neurological:      General: No focal deficit present.      Mental Status: She is alert and oriented to person, place, and time. Mental status is at baseline.   Psychiatric:         Attention and Perception: Attention and perception normal.         Mood and Affect: Mood and affect normal.         Speech: Speech normal.         Behavior: Behavior normal.         Thought Content: Thought content  "normal.         Cognition and Memory: Cognition and memory normal.         Judgment: Judgment normal.       Blood pressure 122/86, pulse 80, temperature 96.9 °F (36.1 °C), height 152.4 cm (60\"), weight 76.9 kg (169 lb 9.6 oz), SpO2 96 %, not currently breastfeeding.    Medication List:   Current Outpatient Medications   Medication Sig Dispense Refill   • sertraline (ZOLOFT) 100 MG tablet Take 2 tablets by mouth Daily. 60 tablet 0   • albuterol sulfate  (90 Base) MCG/ACT inhaler Inhale 2 puffs Every 4 (Four) Hours As Needed for Wheezing. 18 g 2   • cetirizine (zyrTEC) 10 MG tablet Take 1 tablet by mouth Daily. 90 tablet 3   • levothyroxine (SYNTHROID, LEVOTHROID) 125 MCG tablet Take 1 tablet by mouth Daily. 90 tablet 1   • Magnesium Gluconate (MAGNESIUM 27 PO) Take  by mouth.     • methylphenidate (Concerta) 27 MG CR tablet Take 1 tablet by mouth Daily 30 tablet 0   • prenatal vitamin (prenatal, CLASSIC, vitamin) tablet Take  by mouth Daily.       No current facility-administered medications for this visit.       Mental Status Exam:   Hygiene:   good  Cooperation:  Cooperative  Eye Contact:  Good  Psychomotor Behavior:  Appropriate  Affect:  Full range  Hopelessness: Denies  Speech:  Normal  Thought Process:  Goal directed  Thought Content:  Normal  Suicidal:  None  Homicidal:  None  Hallucinations:  None  Delusion:  None  Memory:  Intact  Orientation:  Person, Place, Time and Situation  Reliability:  good  Insight:  Good  Judgement:  Good  Impulse Control:  Good  Physical/Medical Issues:  No        Kourtney Velasquez  reports that she has never smoked. She has never used smokeless tobacco.     Prognosis: Good dependent on medication/follow up and treatment plan compliance.  Functional Status: moderate impairment in areas of daily functioning.      Assessment/Plan   Problems Addressed this Visit    None     Visit Diagnoses     ADHD (attention deficit hyperactivity disorder), inattentive type    -  Primary    " Relevant Medications    sertraline (ZOLOFT) 100 MG tablet    Recurrent major depressive disorder, in partial remission (HCC)        Relevant Medications    sertraline (ZOLOFT) 100 MG tablet    Generalized anxiety disorder        Relevant Medications    sertraline (ZOLOFT) 100 MG tablet      Diagnoses       Codes Comments    ADHD (attention deficit hyperactivity disorder), inattentive type    -  Primary ICD-10-CM: F90.0  ICD-9-CM: 314.00     Recurrent major depressive disorder, in partial remission (HCC)     ICD-10-CM: F33.41  ICD-9-CM: 296.35     Generalized anxiety disorder     ICD-10-CM: F41.1  ICD-9-CM: 300.02         -Continue Zoloft 200 mg daily for anxiety and depression.  -Increase Concerta to 36 mg p.o. every morning for attention and focus  -JELLY reviewed and appropriate.   -Continue psychotherapy with LCSW.  -Medications and pharmacy at this time.    Discussed medication options.  I've explained to her that drugs of the SSRI class can have side effects such as weight gain, sexual dysfunction, insomnia, headache, nausea. These medications are generally effective at alleviating symptoms of anxiety and/or depression. Let me know if significant side effects do occur. Patient was instructed to keep medication in secure place.  The use of energy drinks and decongestants while taking the medication was discouraged.  Informed the medication has abuse potential and can be habit forming was discussed. The dangers of stimulant use including elevated heart rate and blood pressure was disclosed.   Patient verbalized understanding and wishes to proceed.   Reviewed the risks, benefits, and side effects of the medications; patient acknowledged and verbally consented.  Patient is agreeable to call the [facility].  Patient is aware to call 911 or go to the nearest ER should begin having SI/HI.      Reviewed with patient behavioral interventions that have been shown to be helpful with ADHD behaviors. These include but  "are not limited to:  Maintaining a daily schedule  Keeping distractions to a minimum  Providing specific and logical places for one to keep items   Setting small, reachable goals   Using charts and checklists to help stay \"on task\"  Limiting choices    SUPPORTIVE PSYCHOTHERAPY: continuing efforts to promote the therapeutic alliance, address the patient’s issues, and strengthen self awareness, insights, and coping skills.  Assisted patient in processing above session content; acknowledged and normalized patient’s thoughts, feelings, and concerns.  Applied  positive coping skills and behavior management in session.Allowed patient to freely discuss issues without interruption or judgment. Provided safe, confidential environment to facilitate the development of positive therapeutic relationship and encourage open, honest communication. Assisted patient in identifying risk factors which would indicate the need for higher level of care including thoughts to harm self or others and/or self-harming behavior and encouraged patient to contact this office, call 911, or present to the nearest emergency room should any of these events occur. Discussed crisis intervention services and means to access.  Patient adamantly and convincingly denies current suicidal or homicidal ideation or perceptual disturbance.      This document has been electronically signed by DANG East  March 23, 2022 11:23 EDT   Errors in dictation may reflect use of voice recognition software and not all errors in transcription may have been detected prior to signing.  "

## 2022-03-24 DIAGNOSIS — F90.0 ADHD (ATTENTION DEFICIT HYPERACTIVITY DISORDER), INATTENTIVE TYPE: ICD-10-CM

## 2022-03-24 RX ORDER — METHYLPHENIDATE HYDROCHLORIDE 36 MG/1
36 TABLET ORAL DAILY
Qty: 30 TABLET | Refills: 0 | Status: SHIPPED | OUTPATIENT
Start: 2022-03-24 | End: 2022-05-12

## 2022-05-12 ENCOUNTER — OFFICE VISIT (OUTPATIENT)
Dept: PSYCHIATRY | Facility: CLINIC | Age: 37
End: 2022-05-12

## 2022-05-12 VITALS
HEIGHT: 60 IN | BODY MASS INDEX: 34.44 KG/M2 | DIASTOLIC BLOOD PRESSURE: 68 MMHG | TEMPERATURE: 98 F | OXYGEN SATURATION: 99 % | WEIGHT: 175.4 LBS | HEART RATE: 88 BPM | SYSTOLIC BLOOD PRESSURE: 128 MMHG

## 2022-05-12 DIAGNOSIS — F41.1 GENERALIZED ANXIETY DISORDER: ICD-10-CM

## 2022-05-12 DIAGNOSIS — F32.2 CURRENT SEVERE EPISODE OF MAJOR DEPRESSIVE DISORDER WITHOUT PSYCHOTIC FEATURES WITHOUT PRIOR EPISODE: Primary | ICD-10-CM

## 2022-05-12 DIAGNOSIS — F90.0 ADHD (ATTENTION DEFICIT HYPERACTIVITY DISORDER), INATTENTIVE TYPE: ICD-10-CM

## 2022-05-12 PROCEDURE — 99214 OFFICE O/P EST MOD 30 MIN: CPT

## 2022-05-12 RX ORDER — SERTRALINE HYDROCHLORIDE 100 MG/1
200 TABLET, FILM COATED ORAL DAILY
Qty: 180 TABLET | Refills: 0 | Status: SHIPPED | OUTPATIENT
Start: 2022-05-12 | End: 2022-08-01 | Stop reason: SDUPTHER

## 2022-05-12 RX ORDER — ARIPIPRAZOLE 2 MG/1
2 TABLET ORAL DAILY
Qty: 30 TABLET | Refills: 0 | Status: SHIPPED | OUTPATIENT
Start: 2022-05-12 | End: 2022-06-11

## 2022-05-12 NOTE — PROGRESS NOTES
"      Subjective   Kourtney Velasquez is a 37 y.o. female is here today for medication management follow-up.    Chief Complaint: ADHD, depression, anxiety    History of Present Illness:   Patient denies negative side effects associated with current medication regimen.  Patient reports that she is noticed that she is feeling \"more depressed.\"  She reports feelings of sadness, intermittent feelings of helplessness and hopelessness, fatigue, and irritability.  She identifies that anxiety remains present but she feels that it is well controlled.  She reports that she did notice mild improvement in ADHD symptoms with the increased dose of Concerta but reports that she still had lingering difficulties.  She rates that anxiety is a 2 on a scale of 0-10 with 10 being the worst.  She reports that her depression is a 7-8 on a scale of 0-10 with 10 being the worst.  No reported change in sleep averaging 7 to 9 hours of sleep per night without intermittent awakenings or nightmares occurrence.  No reported change in appetite approximating 2-3 meals per day.  She does report that she has noticed an increase in cravings of sweets.  6 pound documented weight gain since last encounter.Body mass index is 34.26 kg/m². She denies AVH.  She denies Sh.  She denies SI and HI.     The following portions of the patient's history were reviewed and updated as appropriate: allergies, current medications, past family history, past medical history, past social history, past surgical history and problem list.    Review of Systems   Constitutional: Negative for activity change, appetite change, fatigue and unexpected weight change.   HENT: Negative for drooling and tinnitus.    Eyes: Negative for photophobia and visual disturbance.   Respiratory: Negative for chest tightness and shortness of breath.    Cardiovascular: Negative for chest pain and palpitations.   Gastrointestinal: Negative for abdominal pain, diarrhea and vomiting.   Endocrine: Negative " for polydipsia and polyuria.   Genitourinary: Negative for dysuria, frequency and urgency.   Musculoskeletal: Negative for gait problem and joint swelling.   Skin: Negative for pallor and rash.   Allergic/Immunologic: Negative for environmental allergies and food allergies.   Neurological: Negative for dizziness, tremors, seizures, syncope, facial asymmetry, speech difficulty, weakness, light-headedness, numbness and headaches.   Hematological: Negative for adenopathy. Does not bruise/bleed easily.   Psychiatric/Behavioral: Positive for decreased concentration and dysphoric mood. Negative for agitation, behavioral problems, confusion, hallucinations, self-injury, sleep disturbance and suicidal ideas. The patient is not nervous/anxious and is not hyperactive.        Objective   Physical Exam  Vitals reviewed.   Constitutional:       Appearance: Normal appearance. She is normal weight.   HENT:      Head: Normocephalic.      Nose: Nose normal.      Mouth/Throat:      Mouth: Mucous membranes are moist.      Pharynx: Oropharynx is clear.   Eyes:      Extraocular Movements: Extraocular movements intact.      Pupils: Pupils are equal, round, and reactive to light.   Cardiovascular:      Rate and Rhythm: Normal rate.   Pulmonary:      Effort: Pulmonary effort is normal.   Abdominal:      General: Abdomen is flat.   Musculoskeletal:         General: Normal range of motion.      Cervical back: Normal range of motion.   Skin:     General: Skin is warm and dry.   Neurological:      General: No focal deficit present.      Mental Status: She is alert and oriented to person, place, and time. Mental status is at baseline.   Psychiatric:         Attention and Perception: Attention and perception normal.         Mood and Affect: Affect normal. Mood is depressed.         Speech: Speech normal.         Behavior: Behavior normal. Behavior is cooperative.         Thought Content: Thought content normal.         Cognition and Memory:  "Cognition and memory normal.         Judgment: Judgment normal.       Blood pressure 128/68, pulse 88, temperature 98 °F (36.7 °C), height 152.4 cm (60\"), weight 79.6 kg (175 lb 6.4 oz), SpO2 99 %, not currently breastfeeding.    Medication List:   Current Outpatient Medications   Medication Sig Dispense Refill   • sertraline (ZOLOFT) 100 MG tablet Take 2 tablets by mouth Daily for 90 days. 180 tablet 0   • albuterol sulfate  (90 Base) MCG/ACT inhaler Inhale 2 puffs Every 4 (Four) Hours As Needed for Wheezing. 18 g 2   • ARIPiprazole (Abilify) 2 MG tablet Take 1 tablet by mouth Daily for 30 days. 30 tablet 0   • cetirizine (zyrTEC) 10 MG tablet Take 1 tablet by mouth Daily. 90 tablet 3   • levothyroxine (SYNTHROID, LEVOTHROID) 125 MCG tablet Take 1 tablet by mouth Daily. 90 tablet 1   • Magnesium Gluconate (MAGNESIUM 27 PO) Take  by mouth.     • prenatal vitamin (prenatal, CLASSIC, vitamin) tablet Take  by mouth Daily.       No current facility-administered medications for this visit.       Mental Status Exam:   Hygiene:   good  Cooperation:  Cooperative  Eye Contact:  Good  Psychomotor Behavior:  Appropriate  Affect:  Full range  Hopelessness: Denies  Speech:  Normal  Thought Process:  Goal directed  Thought Content:  Normal  Suicidal:  None  Homicidal:  None  Hallucinations:  None  Delusion:  None  Memory:  Intact  Orientation:  Person, Place, Time and Situation  Reliability:  good  Insight:  Good  Judgement:  Good  Impulse Control:  Good  Physical/Medical Issues:  No        Kourtney Velasquez  reports that she has never smoked. She has never used smokeless tobacco.     Prognosis: Good dependent on medication/follow up and treatment plan compliance.  Functional Status: moderate impairment in areas of daily functioning.      Assessment & Plan   Problems Addressed this Visit    None     Visit Diagnoses     Current severe episode of major depressive disorder without psychotic features without prior episode (HCC)   "  -  Primary    Relevant Medications    ARIPiprazole (Abilify) 2 MG tablet    sertraline (ZOLOFT) 100 MG tablet    Generalized anxiety disorder        Relevant Medications    ARIPiprazole (Abilify) 2 MG tablet    sertraline (ZOLOFT) 100 MG tablet    ADHD (attention deficit hyperactivity disorder), inattentive type        Relevant Medications    ARIPiprazole (Abilify) 2 MG tablet    sertraline (ZOLOFT) 100 MG tablet      Diagnoses       Codes Comments    Current severe episode of major depressive disorder without psychotic features without prior episode (HCC)    -  Primary ICD-10-CM: F32.2  ICD-9-CM: 296.23     Generalized anxiety disorder     ICD-10-CM: F41.1  ICD-9-CM: 300.02     ADHD (attention deficit hyperactivity disorder), inattentive type     ICD-10-CM: F90.0  ICD-9-CM: 314.00         -Continue Zoloft 200 mg daily for anxiety and depression.  -Start Abilify 2 mg p.o. every morning as adjunct therapy for depression  -Hold Concerta for now will reassess at next encounter.  May transition to amphetamine based medication  -JELLY reviewed and appropriate.   -Continue psychotherapy with LCSW.  -Medications and pharmacy at this time.    Discussed medication options.  I've explained to her that drugs of the SSRI class can have side effects such as weight gain, sexual dysfunction, insomnia, headache, nausea. Patient was instructed on medication side effects, benefits, and also of no treatment.  Patient was given an explanation regarding potential for increased risk of diabetes, lipids, and weight gain.  Labs will be assessed as clinically indicated.  Diet was discussed especially healthy diet choices and increasing activity and exercise. Patient was strongly urged to continue weight maintance or weight loss efforts.  Patient reported verbalized understanding of instructionsReviewed the risks, benefits, and side effects of the medications; patient acknowledged and verbally consented. Patient is agreeable to call the  [facility]. Patient is aware to call 911 or go to the nearest ER should begin having SI/HI.       SUPPORTIVE PSYCHOTHERAPY: continuing efforts to promote the therapeutic alliance, address the patient’s issues, and strengthen self awareness, insights, and coping skills.  Assisted patient in processing above session content; acknowledged and normalized patient’s thoughts, feelings, and concerns.  Applied  positive coping skills and behavior management in session.Allowed patient to freely discuss issues without interruption or judgment. Provided safe, confidential environment to facilitate the development of positive therapeutic relationship and encourage open, honest communication. Assisted patient in identifying risk factors which would indicate the need for higher level of care including thoughts to harm self or others and/or self-harming behavior and encouraged patient to contact this office, call 911, or present to the nearest emergency room should any of these events occur. Discussed crisis intervention services and means to access.  Patient adamantly and convincingly denies current suicidal or homicidal ideation or perceptual disturbance.    Return in about 4 weeks (around 6/9/2022), or if symptoms worsen or fail to improve, for Next scheduled follow up.        This document has been electronically signed by DANG East  May 12, 2022 15:01 EDT   Errors in dictation may reflect use of voice recognition software and not all errors in transcription may have been detected prior to signing.

## 2022-08-01 DIAGNOSIS — F33.0 MDD (MAJOR DEPRESSIVE DISORDER), RECURRENT EPISODE, MILD: ICD-10-CM

## 2022-08-01 DIAGNOSIS — F33.41 RECURRENT MAJOR DEPRESSIVE DISORDER, IN PARTIAL REMISSION: Primary | ICD-10-CM

## 2022-08-01 DIAGNOSIS — F41.1 GENERALIZED ANXIETY DISORDER: ICD-10-CM

## 2022-08-01 RX ORDER — SERTRALINE HYDROCHLORIDE 100 MG/1
200 TABLET, FILM COATED ORAL DAILY
Qty: 120 TABLET | Refills: 0 | Status: SHIPPED | OUTPATIENT
Start: 2022-08-01 | End: 2022-10-05 | Stop reason: SDUPTHER

## 2022-08-01 RX ORDER — ARIPIPRAZOLE 5 MG/1
5 TABLET ORAL DAILY
Qty: 60 TABLET | Refills: 0 | Status: SHIPPED | OUTPATIENT
Start: 2022-08-01 | End: 2022-09-30

## 2022-09-20 ENCOUNTER — OFFICE VISIT (OUTPATIENT)
Dept: FAMILY MEDICINE CLINIC | Facility: CLINIC | Age: 37
End: 2022-09-20

## 2022-09-20 VITALS
BODY MASS INDEX: 36.28 KG/M2 | HEIGHT: 60 IN | SYSTOLIC BLOOD PRESSURE: 122 MMHG | WEIGHT: 184.8 LBS | TEMPERATURE: 97.6 F | HEART RATE: 96 BPM | DIASTOLIC BLOOD PRESSURE: 74 MMHG | OXYGEN SATURATION: 100 %

## 2022-09-20 DIAGNOSIS — R25.2 LEG CRAMPING: ICD-10-CM

## 2022-09-20 DIAGNOSIS — J30.2 SEASONAL ALLERGIES: ICD-10-CM

## 2022-09-20 DIAGNOSIS — Z00.00 ANNUAL PHYSICAL EXAM: Primary | ICD-10-CM

## 2022-09-20 DIAGNOSIS — Z51.81 MEDICATION MONITORING ENCOUNTER: ICD-10-CM

## 2022-09-20 DIAGNOSIS — E03.9 ACQUIRED HYPOTHYROIDISM: ICD-10-CM

## 2022-09-20 DIAGNOSIS — R05.3 CHRONIC COUGH: ICD-10-CM

## 2022-09-20 DIAGNOSIS — J45.20 MILD INTERMITTENT EXTRINSIC ASTHMA WITHOUT COMPLICATION: ICD-10-CM

## 2022-09-20 LAB
ALBUMIN SERPL-MCNC: 4.4 G/DL (ref 3.5–5.2)
ALBUMIN/GLOB SERPL: 1.8 G/DL
ALP SERPL-CCNC: 84 U/L (ref 39–117)
ALT SERPL W P-5'-P-CCNC: 16 U/L (ref 1–33)
ANION GAP SERPL CALCULATED.3IONS-SCNC: 10 MMOL/L (ref 5–15)
AST SERPL-CCNC: 17 U/L (ref 1–32)
BASOPHILS # BLD AUTO: 0.04 10*3/MM3 (ref 0–0.2)
BASOPHILS NFR BLD AUTO: 0.7 % (ref 0–1.5)
BILIRUB SERPL-MCNC: 0.4 MG/DL (ref 0–1.2)
BUN SERPL-MCNC: 11 MG/DL (ref 6–20)
BUN/CREAT SERPL: 18.3 (ref 7–25)
CALCIUM SPEC-SCNC: 8.9 MG/DL (ref 8.6–10.5)
CHLORIDE SERPL-SCNC: 104 MMOL/L (ref 98–107)
CO2 SERPL-SCNC: 23 MMOL/L (ref 22–29)
CREAT SERPL-MCNC: 0.6 MG/DL (ref 0.57–1)
DEPRECATED RDW RBC AUTO: 37.2 FL (ref 37–54)
EGFRCR SERPLBLD CKD-EPI 2021: 120.2 ML/MIN/1.73
EOSINOPHIL # BLD AUTO: 0.14 10*3/MM3 (ref 0–0.4)
EOSINOPHIL NFR BLD AUTO: 2.4 % (ref 0.3–6.2)
ERYTHROCYTE [DISTWIDTH] IN BLOOD BY AUTOMATED COUNT: 11.8 % (ref 12.3–15.4)
GLOBULIN UR ELPH-MCNC: 2.4 GM/DL
GLUCOSE SERPL-MCNC: 88 MG/DL (ref 65–99)
HCT VFR BLD AUTO: 38.8 % (ref 34–46.6)
HGB BLD-MCNC: 13.2 G/DL (ref 12–15.9)
IMM GRANULOCYTES # BLD AUTO: 0.01 10*3/MM3 (ref 0–0.05)
IMM GRANULOCYTES NFR BLD AUTO: 0.2 % (ref 0–0.5)
LYMPHOCYTES # BLD AUTO: 1.58 10*3/MM3 (ref 0.7–3.1)
LYMPHOCYTES NFR BLD AUTO: 26.7 % (ref 19.6–45.3)
MAGNESIUM SERPL-MCNC: 2.2 MG/DL (ref 1.6–2.6)
MCH RBC QN AUTO: 29.2 PG (ref 26.6–33)
MCHC RBC AUTO-ENTMCNC: 34 G/DL (ref 31.5–35.7)
MCV RBC AUTO: 85.8 FL (ref 79–97)
MONOCYTES # BLD AUTO: 0.42 10*3/MM3 (ref 0.1–0.9)
MONOCYTES NFR BLD AUTO: 7.1 % (ref 5–12)
NEUTROPHILS NFR BLD AUTO: 3.73 10*3/MM3 (ref 1.7–7)
NEUTROPHILS NFR BLD AUTO: 62.9 % (ref 42.7–76)
NRBC BLD AUTO-RTO: 0 /100 WBC (ref 0–0.2)
PLATELET # BLD AUTO: 337 10*3/MM3 (ref 140–450)
PMV BLD AUTO: 9.8 FL (ref 6–12)
POTASSIUM SERPL-SCNC: 4 MMOL/L (ref 3.5–5.2)
PROT SERPL-MCNC: 6.8 G/DL (ref 6–8.5)
RBC # BLD AUTO: 4.52 10*6/MM3 (ref 3.77–5.28)
SODIUM SERPL-SCNC: 137 MMOL/L (ref 136–145)
TSH SERPL DL<=0.05 MIU/L-ACNC: 1.67 UIU/ML (ref 0.27–4.2)
WBC NRBC COR # BLD: 5.92 10*3/MM3 (ref 3.4–10.8)

## 2022-09-20 PROCEDURE — 85025 COMPLETE CBC W/AUTO DIFF WBC: CPT | Performed by: FAMILY MEDICINE

## 2022-09-20 PROCEDURE — 80053 COMPREHEN METABOLIC PANEL: CPT | Performed by: FAMILY MEDICINE

## 2022-09-20 PROCEDURE — 83735 ASSAY OF MAGNESIUM: CPT | Performed by: FAMILY MEDICINE

## 2022-09-20 PROCEDURE — 84443 ASSAY THYROID STIM HORMONE: CPT | Performed by: FAMILY MEDICINE

## 2022-09-20 PROCEDURE — 99395 PREV VISIT EST AGE 18-39: CPT | Performed by: FAMILY MEDICINE

## 2022-09-20 RX ORDER — CETIRIZINE HYDROCHLORIDE 10 MG/1
10 TABLET ORAL DAILY
Qty: 90 TABLET | Refills: 3 | Status: SHIPPED | OUTPATIENT
Start: 2022-09-20

## 2022-09-20 RX ORDER — ALBUTEROL SULFATE 90 UG/1
2 AEROSOL, METERED RESPIRATORY (INHALATION) EVERY 4 HOURS PRN
Qty: 18 G | Refills: 2 | Status: SHIPPED | OUTPATIENT
Start: 2022-09-20

## 2022-09-20 RX ORDER — LEVOTHYROXINE SODIUM 0.12 MG/1
125 TABLET ORAL DAILY
Qty: 90 TABLET | Refills: 2 | Status: SHIPPED | OUTPATIENT
Start: 2022-09-20

## 2022-09-20 NOTE — PROGRESS NOTES
Subjective   Kourtney Velasquez is a 37 y.o. female.   Pt presents today with CC of Hypothyroidism      History of Present Illness   History of Present Illness  1.  Patient is a 37-year-old female here to follow-up on hypothyroidism.  She takes levothyroxine 125 mcg daily.  She is agreeable to have her levels rechecked.  She reports weight gain(Abilify side effect?  Hypothyroidism?),  And some new numbness that comes in her pinky and ring finger both sides when she drives, and when she holds her cell phone out in front of her.  #2 she has been experiencing lower leg cramping over the past several weeks.  She states it started when she wore a pair of new insoles, but it has not gotten better.  Now been present for 3 weeks.  Denies any weakness, but her legs frequently cramp.  #3 she is here for annual physical exam.  Her last Pap smear was about 9 months ago.  #4 she has chronic cough and allergic asthma for which she takes albuterol as needed.         The following portions of the patient's history were reviewed and updated as appropriate: allergies, current medications, past family history, past medical history, past social history, past surgical history and problem list.    Review of Systems   Constitutional: Negative for chills, fever and unexpected weight loss.   HENT: Negative for congestion and sore throat.    Eyes: Negative for blurred vision and visual disturbance.   Respiratory: Negative for cough and wheezing.    Cardiovascular: Negative for chest pain and palpitations.   Gastrointestinal: Negative for abdominal pain and diarrhea.   Endocrine: Negative for cold intolerance and heat intolerance.   Genitourinary: Negative for dysuria.   Musculoskeletal: Negative for arthralgias and neck stiffness.   Neurological: Negative for dizziness, seizures and syncope.   Psychiatric/Behavioral: Negative for self-injury, suicidal ideas and depressed mood.       Objective   Physical Exam  Vitals and nursing note reviewed.    Constitutional:       Appearance: She is well-developed.   HENT:      Head: Normocephalic and atraumatic.      Right Ear: External ear normal.      Left Ear: External ear normal.      Nose: Nose normal.   Eyes:      Conjunctiva/sclera: Conjunctivae normal.      Pupils: Pupils are equal, round, and reactive to light.   Cardiovascular:      Rate and Rhythm: Normal rate and regular rhythm.      Heart sounds: Normal heart sounds.   Pulmonary:      Effort: Pulmonary effort is normal.      Breath sounds: Normal breath sounds.   Abdominal:      General: Bowel sounds are normal.      Palpations: Abdomen is soft.   Musculoskeletal:      Cervical back: Normal range of motion and neck supple.      Comments: Shoulders, elbows, and wrists with full range of motion and 5/5 strength bilaterally. DTRs symmetrical. Neck with full range of motion in flexion, extension, side-bending, and rotation.  Hips, knees, and ankles with full range of motion and 5/5 strength bilaterally. DTRs symmetrical. Straight leg raise test negative bilaterally.  Tibialis anterior is little firm bilaterally, but within normal limits.  Osteopathic: T5-7 are neutral side bent right and rotated left,   Skin:     General: Skin is warm and dry.   Neurological:      Mental Status: She is alert and oriented to person, place, and time.   Psychiatric:         Behavior: Behavior normal.           Assessment & Plan   Diagnoses and all orders for this visit:    1. Annual physical exam (Primary)  -     Comprehensive Metabolic Panel; Future  -     CBC Auto Differential; Future  -     Magnesium; Future  -     TSH; Future  -     Comprehensive Metabolic Panel  -     CBC Auto Differential  -     Magnesium  -     TSH    2. Acquired hypothyroidism  -     TSH; Future  -     levothyroxine (SYNTHROID, LEVOTHROID) 125 MCG tablet; Take 1 tablet by mouth Daily.  Dispense: 90 tablet; Refill: 2  -     TSH  We will recheck her thyroid levels because of weight gain and cramping with  neuropathy in her hand.  I suspect it is compression on the ulnar nerve above the elbow when on her phone.  Physiology this discussed and she was agreeable to change the way she lays.  3. Medication monitoring encounter  -     Comprehensive Metabolic Panel; Future  -     CBC Auto Differential; Future  -     Magnesium; Future  -     Comprehensive Metabolic Panel  -     CBC Auto Differential  -     Magnesium    4. Leg cramping  -     Comprehensive Metabolic Panel; Future  -     Magnesium; Future  -     Comprehensive Metabolic Panel  -     Magnesium  Reassurance given.  Normal exam.  If condition continues for 6 weeks, recommend follow-up to consider further work-up.  Checking electrolytes today.  5. Chronic cough  -     albuterol sulfate  (90 Base) MCG/ACT inhaler; Inhale 2 puffs Every 4 (Four) Hours As Needed for Wheezing.  Dispense: 18 g; Refill: 2    6. Mild intermittent extrinsic asthma without complication  -     albuterol sulfate  (90 Base) MCG/ACT inhaler; Inhale 2 puffs Every 4 (Four) Hours As Needed for Wheezing.  Dispense: 18 g; Refill: 2    7. Seasonal allergies  -     cetirizine (zyrTEC) 10 MG tablet; Take 1 tablet by mouth Daily.  Dispense: 90 tablet; Refill: 3                  Class 2 Severe Obesity (BMI >=35 and <=39.9). Obesity-related health conditions include the following: none. Obesity is worsening. BMI is is above average; BMI management plan is completed. We discussed portion control and increasing exercise.

## 2022-09-22 ENCOUNTER — TELEPHONE (OUTPATIENT)
Dept: FAMILY MEDICINE CLINIC | Facility: CLINIC | Age: 37
End: 2022-09-22

## 2022-10-05 ENCOUNTER — OFFICE VISIT (OUTPATIENT)
Dept: PSYCHIATRY | Facility: CLINIC | Age: 37
End: 2022-10-05

## 2022-10-05 VITALS
SYSTOLIC BLOOD PRESSURE: 140 MMHG | DIASTOLIC BLOOD PRESSURE: 86 MMHG | HEIGHT: 60 IN | WEIGHT: 188.2 LBS | HEART RATE: 94 BPM | BODY MASS INDEX: 36.95 KG/M2 | TEMPERATURE: 97.8 F | OXYGEN SATURATION: 99 %

## 2022-10-05 DIAGNOSIS — F41.1 GENERALIZED ANXIETY DISORDER: ICD-10-CM

## 2022-10-05 DIAGNOSIS — F90.0 ADHD (ATTENTION DEFICIT HYPERACTIVITY DISORDER), INATTENTIVE TYPE: Primary | ICD-10-CM

## 2022-10-05 DIAGNOSIS — F33.41 RECURRENT MAJOR DEPRESSIVE DISORDER, IN PARTIAL REMISSION: ICD-10-CM

## 2022-10-05 PROCEDURE — 99214 OFFICE O/P EST MOD 30 MIN: CPT

## 2022-10-05 RX ORDER — SERTRALINE HYDROCHLORIDE 100 MG/1
200 TABLET, FILM COATED ORAL DAILY
Qty: 120 TABLET | Refills: 0 | Status: SHIPPED | OUTPATIENT
Start: 2022-10-05 | End: 2022-11-09 | Stop reason: SDUPTHER

## 2022-10-05 RX ORDER — ARIPIPRAZOLE 5 MG/1
5 TABLET ORAL DAILY
Qty: 60 TABLET | Refills: 0 | Status: SHIPPED | OUTPATIENT
Start: 2022-10-05 | End: 2022-11-09 | Stop reason: SDUPTHER

## 2022-10-05 NOTE — PROGRESS NOTES
"      Subjective   Kourtney Velasquez is a 37 y.o. female is here today for medication management follow-up.    Chief Complaint: ADHD, depression, anxiety    History of Present Illness:     Patient denies negative side effects associated with current regimen.  Patient reports that depression is \"almost all the way better.\"  She reports that anxiety has \"been really good.\"  Reports anxiety and depression both to be a 1 on a scale of 0-10 with 10 being the worst.  Denies any occurrence of panic.  Reports the biggest difficulties at this time and ADHD concerns which is impairing to work.  Reports that she is having significant difficulty with task initiation and completion.  Continued difficulty with disorganization, forgetfulness, and distractibility.  She identifies that she finds herself \"putting everything off then just not doing it.\"  Reports that she has had an increase in appetite and feels that she is \"eating out of boredom.\"  13 pound documented weight gain since May 2022.  Identifies that physical activity is also decreased. Body mass index is 36.76 kg/m².  Sleep is well and is approximating 8 to 9 hours per night with decreased awakenings and denies any occurrence of nightmares.  Denies AVH.  Denies SI and HI.  Denies self-harm.    The following portions of the patient's history were reviewed and updated as appropriate: allergies, current medications, past family history, past medical history, past social history, past surgical history and problem list.    Review of Systems   Constitutional: Negative for activity change, appetite change, fatigue and unexpected weight change.   HENT: Negative for drooling and tinnitus.    Eyes: Negative for photophobia and visual disturbance.   Respiratory: Negative for chest tightness and shortness of breath.    Cardiovascular: Negative for chest pain and palpitations.   Gastrointestinal: Negative for abdominal pain, diarrhea and vomiting.   Endocrine: Negative for polydipsia " and polyuria.   Genitourinary: Negative for dysuria, frequency and urgency.   Musculoskeletal: Negative for gait problem and joint swelling.   Skin: Negative for pallor and rash.   Allergic/Immunologic: Negative for environmental allergies and food allergies.   Neurological: Negative for dizziness, tremors, seizures, syncope, facial asymmetry, speech difficulty, weakness, light-headedness, numbness and headaches.   Hematological: Negative for adenopathy. Does not bruise/bleed easily.   Psychiatric/Behavioral: Positive for decreased concentration. Negative for agitation, behavioral problems, confusion, dysphoric mood, hallucinations, self-injury, sleep disturbance and suicidal ideas. The patient is not nervous/anxious and is not hyperactive.        Objective   Physical Exam  Vitals reviewed.   Constitutional:       Appearance: Normal appearance. She is normal weight.   HENT:      Head: Normocephalic.      Nose: Nose normal.      Mouth/Throat:      Mouth: Mucous membranes are moist.      Pharynx: Oropharynx is clear.   Eyes:      Extraocular Movements: Extraocular movements intact.      Pupils: Pupils are equal, round, and reactive to light.   Cardiovascular:      Rate and Rhythm: Normal rate.   Pulmonary:      Effort: Pulmonary effort is normal.   Abdominal:      General: Abdomen is flat.   Musculoskeletal:         General: Normal range of motion.      Cervical back: Normal range of motion.   Skin:     General: Skin is warm and dry.   Neurological:      General: No focal deficit present.      Mental Status: She is alert and oriented to person, place, and time. Mental status is at baseline.   Psychiatric:         Attention and Perception: Perception normal. She is inattentive.         Mood and Affect: Mood and affect normal. Mood is not depressed.         Speech: Speech normal.         Behavior: Behavior normal. Behavior is cooperative.         Thought Content: Thought content normal.         Cognition and Memory:  "Cognition and memory normal.         Judgment: Judgment normal.       Blood pressure 140/86, pulse 94, temperature 97.8 °F (36.6 °C), height 152.4 cm (60\"), weight 85.4 kg (188 lb 3.2 oz), SpO2 99 %, not currently breastfeeding.    Medication List:   Current Outpatient Medications   Medication Sig Dispense Refill   • sertraline (ZOLOFT) 100 MG tablet Take 2 tablets by mouth Daily for 60 days. 120 tablet 0   • albuterol sulfate  (90 Base) MCG/ACT inhaler Inhale 2 puffs Every 4 (Four) Hours As Needed for Wheezing. 18 g 2   • ARIPiprazole (Abilify) 5 MG tablet Take 1 tablet by mouth Daily for 60 days. 60 tablet 0   • cetirizine (zyrTEC) 10 MG tablet Take 1 tablet by mouth Daily. 90 tablet 3   • levothyroxine (SYNTHROID, LEVOTHROID) 125 MCG tablet Take 1 tablet by mouth Daily. 90 tablet 2   • lisdexamfetamine (Vyvanse) 30 MG capsule Take 1 capsule by mouth Every Morning 30 capsule 0   • Magnesium Gluconate (MAGNESIUM 27 PO) Take  by mouth.     • prenatal vitamin (prenatal, CLASSIC, vitamin) tablet Take  by mouth Daily.       No current facility-administered medications for this visit.       Mental Status Exam:   Hygiene:   good  Cooperation:  Cooperative  Eye Contact:  Good  Psychomotor Behavior:  Appropriate  Affect:  Full range  Hopelessness: Denies  Speech:  Normal  Thought Process:  Goal directed  Thought Content:  Normal  Suicidal:  None  Homicidal:  None  Hallucinations:  None  Delusion:  None  Memory:  Intact  Orientation:  Person, Place, Time and Situation  Reliability:  good  Insight:  Good  Judgement:  Good  Impulse Control:  Good  Physical/Medical Issues:  No        Kourtney Velasquez  reports that she has never smoked. She has never used smokeless tobacco.     Prognosis: Good dependent on medication/follow up and treatment plan compliance.  Functional Status: moderate impairment in areas of daily functioning.      Assessment & Plan   Problems Addressed this Visit    None     Visit Diagnoses     ADHD " (attention deficit hyperactivity disorder), inattentive type    -  Primary    Relevant Medications    lisdexamfetamine (Vyvanse) 30 MG capsule    sertraline (ZOLOFT) 100 MG tablet    ARIPiprazole (Abilify) 5 MG tablet    Generalized anxiety disorder        Relevant Medications    lisdexamfetamine (Vyvanse) 30 MG capsule    sertraline (ZOLOFT) 100 MG tablet    ARIPiprazole (Abilify) 5 MG tablet    Recurrent major depressive disorder, in partial remission (HCC)        Relevant Medications    lisdexamfetamine (Vyvanse) 30 MG capsule    sertraline (ZOLOFT) 100 MG tablet    ARIPiprazole (Abilify) 5 MG tablet      Diagnoses       Codes Comments    ADHD (attention deficit hyperactivity disorder), inattentive type    -  Primary ICD-10-CM: F90.0  ICD-9-CM: 314.00     Generalized anxiety disorder     ICD-10-CM: F41.1  ICD-9-CM: 300.02     Recurrent major depressive disorder, in partial remission (HCC)     ICD-10-CM: F33.41  ICD-9-CM: 296.35         -Continue Zoloft 200 mg daily for anxiety and depression.  -Continue Abilify 5 mg p.o. daily as adjunct for mood; likely will reduce that next encounter with plans of discontinuation  -Start Vyvanse 30 mg p.o. every morning for ADHD  -JELLY reviewed and appropriate.   -Continue psychotherapy with LCSW.  -Medications and pharmacy at this time.    Discussed medication options.  Patient has noticed a positive reduction in symptom burden and feels that anxiety and depression are both well controlled at this time.  Largest difficulty is ADHD burden.  Patient has trialed and failed Concerta in the past with minimal improvement.  Plan to start Vyvanse 30 mg p.o. daily to better target ADHD symptomology.  I have reintegrated to her side effects associated with SSRIs and SGA's.  Labs recently drawn by PCP, reviewed, stable. Diet was discussed especially healthy diet choices and increasing activity and exercise. Patient was strongly urged to continue weight maintance or weight loss efforts.   Patient was instructed to keep medication in secure place.  The use of energy drinks and decongestants while taking the medication was discouraged.  Informed the medication has abuse potential and can be habit forming was discussed. The dangers of stimulant use including elevated heart rate and blood pressure was disclosed.   Patient verbalized understanding and wishes to proceed.  Narcotic agreement previously signed in on file: Requirements reviewed.Reviewed the risks, benefits, and side effects of the medications; patient acknowledged and verbally consented. Patient is agreeable to call the [facility]. Patient is aware to call 911 or go to the nearest ER should begin having SI/HI.     Return in about 4 weeks (around 11/2/2022).        This document has been electronically signed by DANG East  October 6, 2022 08:36 EDT   Errors in dictation may reflect use of voice recognition software and not all errors in transcription may have been detected prior to signing.

## 2022-11-09 ENCOUNTER — OFFICE VISIT (OUTPATIENT)
Dept: PSYCHIATRY | Facility: CLINIC | Age: 37
End: 2022-11-09

## 2022-11-09 VITALS
HEART RATE: 95 BPM | OXYGEN SATURATION: 97 % | SYSTOLIC BLOOD PRESSURE: 124 MMHG | WEIGHT: 185.8 LBS | BODY MASS INDEX: 36.48 KG/M2 | TEMPERATURE: 97.5 F | DIASTOLIC BLOOD PRESSURE: 85 MMHG | HEIGHT: 60 IN

## 2022-11-09 DIAGNOSIS — F41.1 GENERALIZED ANXIETY DISORDER: Primary | ICD-10-CM

## 2022-11-09 DIAGNOSIS — F90.0 ADHD (ATTENTION DEFICIT HYPERACTIVITY DISORDER), INATTENTIVE TYPE: ICD-10-CM

## 2022-11-09 DIAGNOSIS — F33.41 RECURRENT MAJOR DEPRESSIVE DISORDER, IN PARTIAL REMISSION: ICD-10-CM

## 2022-11-09 PROCEDURE — 99214 OFFICE O/P EST MOD 30 MIN: CPT

## 2022-11-09 RX ORDER — SERTRALINE HYDROCHLORIDE 100 MG/1
200 TABLET, FILM COATED ORAL DAILY
Qty: 120 TABLET | Refills: 0 | Status: SHIPPED | OUTPATIENT
Start: 2022-11-09 | End: 2023-01-04 | Stop reason: SDUPTHER

## 2022-11-09 RX ORDER — ARIPIPRAZOLE 5 MG/1
5 TABLET ORAL DAILY
Qty: 60 TABLET | Refills: 0 | Status: SHIPPED | OUTPATIENT
Start: 2022-11-09 | End: 2023-01-04 | Stop reason: SDUPTHER

## 2022-11-09 RX ORDER — BUSPIRONE HYDROCHLORIDE 5 MG/1
5 TABLET ORAL 2 TIMES DAILY
Qty: 120 TABLET | Refills: 0 | Status: SHIPPED | OUTPATIENT
Start: 2022-11-09 | End: 2023-01-04

## 2022-11-09 NOTE — PROGRESS NOTES
Subjective   Kourtney Velasquez is a 37 y.o. female is here today for medication management follow-up.    Chief Complaint: ADHD, depression, anxiety    History of Present Illness:     Patient denies negative side effects associated with current regimen.  Reports an increase in situational stressors.  Reports that she felt that Vyvanse was effective in ADHD symptom relief however felt that it did make anxiety worse temporarily.  Reports that influx of responsibility at work and reports that she has been working more without the ability to have days off.  Depression symptoms have been controlled.  Sleep is well and is approximating 8 hours per night without awakenings or nightmares.  Appetite has decreased with increased anxiety.  3 pounds loss since last and office encounter.Body mass index is 36.29 kg/m².  Gary of ADHD remains elevated. Denies AVH.  Denies SI and HI.  Denies self-harm.    The following portions of the patient's history were reviewed and updated as appropriate: allergies, current medications, past family history, past medical history, past social history, past surgical history and problem list.    Review of Systems   Constitutional: Negative for activity change, appetite change, fatigue and unexpected weight change.   HENT: Negative for drooling and tinnitus.    Eyes: Negative for photophobia and visual disturbance.   Respiratory: Negative for chest tightness and shortness of breath.    Cardiovascular: Negative for chest pain and palpitations.   Gastrointestinal: Negative for abdominal pain, diarrhea and vomiting.   Endocrine: Negative for polydipsia and polyuria.   Genitourinary: Negative for dysuria, frequency and urgency.   Musculoskeletal: Negative for gait problem and joint swelling.   Skin: Negative for pallor and rash.   Allergic/Immunologic: Negative for environmental allergies and food allergies.   Neurological: Negative for dizziness, tremors, seizures, syncope, facial asymmetry, speech  "difficulty, weakness, light-headedness, numbness and headaches.   Hematological: Negative for adenopathy. Does not bruise/bleed easily.   Psychiatric/Behavioral: Positive for decreased concentration. Negative for agitation, behavioral problems, confusion, dysphoric mood, hallucinations, self-injury, sleep disturbance and suicidal ideas. The patient is nervous/anxious. The patient is not hyperactive.        Objective   Physical Exam  Vitals reviewed.   Constitutional:       Appearance: Normal appearance. She is normal weight.   HENT:      Head: Normocephalic.      Nose: Nose normal.      Mouth/Throat:      Mouth: Mucous membranes are moist.      Pharynx: Oropharynx is clear.   Eyes:      Extraocular Movements: Extraocular movements intact.      Pupils: Pupils are equal, round, and reactive to light.   Cardiovascular:      Rate and Rhythm: Normal rate.   Pulmonary:      Effort: Pulmonary effort is normal.   Abdominal:      General: Abdomen is flat.   Musculoskeletal:         General: Normal range of motion.      Cervical back: Normal range of motion.   Skin:     General: Skin is warm and dry.   Neurological:      General: No focal deficit present.      Mental Status: She is alert and oriented to person, place, and time. Mental status is at baseline.   Psychiatric:         Attention and Perception: Perception normal. She is inattentive.         Mood and Affect: Mood and affect normal. Mood is not depressed.         Speech: Speech normal.         Behavior: Behavior normal. Behavior is cooperative.         Thought Content: Thought content normal.         Cognition and Memory: Cognition and memory normal.         Judgment: Judgment normal.       Blood pressure 124/85, pulse 95, temperature 97.5 °F (36.4 °C), height 152.4 cm (60\"), weight 84.3 kg (185 lb 12.8 oz), SpO2 97 %, not currently breastfeeding.    Medication List:   Current Outpatient Medications   Medication Sig Dispense Refill   • ARIPiprazole (Abilify) 5 MG " tablet Take 1 tablet by mouth Daily for 60 days. 60 tablet 0   • sertraline (ZOLOFT) 100 MG tablet Take 2 tablets by mouth Daily for 60 days. 120 tablet 0   • albuterol sulfate  (90 Base) MCG/ACT inhaler Inhale 2 puffs Every 4 (Four) Hours As Needed for Wheezing. 18 g 2   • busPIRone (BUSPAR) 5 MG tablet Take 1 tablet by mouth 2 (Two) Times a Day for 60 days. 120 tablet 0   • cetirizine (zyrTEC) 10 MG tablet Take 1 tablet by mouth Daily. 90 tablet 3   • levothyroxine (SYNTHROID, LEVOTHROID) 125 MCG tablet Take 1 tablet by mouth Daily. 90 tablet 2   • lisdexamfetamine (Vyvanse) 30 MG capsule Take 1 capsule by mouth Every Morning 30 capsule 0   • Magnesium Gluconate (MAGNESIUM 27 PO) Take  by mouth.     • prenatal vitamin (prenatal, CLASSIC, vitamin) tablet Take  by mouth Daily.       No current facility-administered medications for this visit.       Mental Status Exam:   Hygiene:   good  Cooperation:  Cooperative  Eye Contact:  Good  Psychomotor Behavior:  Appropriate  Affect:  Full range  Hopelessness: Denies  Speech:  Normal  Thought Process:  Goal directed  Thought Content:  Normal  Suicidal:  None  Homicidal:  None  Hallucinations:  None  Delusion:  None  Memory:  Intact  Orientation:  Person, Place, Time and Situation  Reliability:  good  Insight:  Good  Judgement:  Good  Impulse Control:  Good  Physical/Medical Issues:  No        Kourtney Velasquez  reports that she has never smoked. She has never used smokeless tobacco.     Prognosis: Good dependent on medication/follow up and treatment plan compliance.  Functional Status: moderate impairment in areas of daily functioning.      Assessment & Plan   Problems Addressed this Visit    None  Visit Diagnoses     Generalized anxiety disorder    -  Primary    Relevant Medications    busPIRone (BUSPAR) 5 MG tablet    sertraline (ZOLOFT) 100 MG tablet    ARIPiprazole (Abilify) 5 MG tablet    Recurrent major depressive disorder, in partial remission (HCC)         Relevant Medications    busPIRone (BUSPAR) 5 MG tablet    sertraline (ZOLOFT) 100 MG tablet    ARIPiprazole (Abilify) 5 MG tablet    ADHD (attention deficit hyperactivity disorder), inattentive type        Relevant Medications    busPIRone (BUSPAR) 5 MG tablet    sertraline (ZOLOFT) 100 MG tablet    ARIPiprazole (Abilify) 5 MG tablet      Diagnoses       Codes Comments    Generalized anxiety disorder    -  Primary ICD-10-CM: F41.1  ICD-9-CM: 300.02     Recurrent major depressive disorder, in partial remission (HCC)     ICD-10-CM: F33.41  ICD-9-CM: 296.35     ADHD (attention deficit hyperactivity disorder), inattentive type     ICD-10-CM: F90.0  ICD-9-CM: 314.00         -Continue Zoloft 200 mg daily for anxiety and depression.  -Continue Abilify 5 mg p.o. daily as adjunct for mood  -Start BuSpar 5 mg p.o. twice daily for anxiety  -Hold Vyvanse 30 mg p.o. every morning for ADHD for now associated with increased anxiety.  -JELLY reviewed and appropriate.   -Continue psychotherapy with LCSW.  -Medications and pharmacy at this time.    Discussed medication and psychotherapy options.  Patient feels that depression has been adequately controlled on current regimen of Zoloft and Abilify.  Discussed reducing Abilify dose, patient declines at this time.  Influx anxiety is largest complaint at this time.  Start BuSpar 5 mg twice daily to target this.  Patient did notice positive improvement with ADHD symptoms with Vyvanse however did notice an increase in anxiety.  Holding at this time if needed in the future will reduce dose to 20mg.  Reintegrated potential side effects associated with all the above noted medications.Patient verbalized understanding and wishes to proceed. Reviewed the risks, benefits, and side effects of the medications; patient acknowledged and verbally consented. Patient is agreeable to call the [facility]. Patient is aware to call 911 or go to the nearest ER should begin having SI/HI.     Return in  about 8 weeks (around 1/4/2023), or if symptoms worsen or fail to improve, for Next scheduled follow up.        This document has been electronically signed by DANG East  November 9, 2022 10:12 EST   Errors in dictation may reflect use of voice recognition software and not all errors in transcription may have been detected prior to signing.

## 2023-01-04 ENCOUNTER — OFFICE VISIT (OUTPATIENT)
Dept: PSYCHIATRY | Facility: CLINIC | Age: 38
End: 2023-01-04
Payer: COMMERCIAL

## 2023-01-04 VITALS
HEART RATE: 98 BPM | TEMPERATURE: 98 F | BODY MASS INDEX: 37.15 KG/M2 | HEIGHT: 60 IN | SYSTOLIC BLOOD PRESSURE: 127 MMHG | WEIGHT: 189.2 LBS | OXYGEN SATURATION: 96 % | DIASTOLIC BLOOD PRESSURE: 85 MMHG

## 2023-01-04 DIAGNOSIS — F33.41 RECURRENT MAJOR DEPRESSIVE DISORDER, IN PARTIAL REMISSION: ICD-10-CM

## 2023-01-04 DIAGNOSIS — F90.0 ADHD (ATTENTION DEFICIT HYPERACTIVITY DISORDER), INATTENTIVE TYPE: Primary | ICD-10-CM

## 2023-01-04 DIAGNOSIS — F41.1 GENERALIZED ANXIETY DISORDER: ICD-10-CM

## 2023-01-04 PROCEDURE — 99214 OFFICE O/P EST MOD 30 MIN: CPT

## 2023-01-04 RX ORDER — ARIPIPRAZOLE 5 MG/1
5 TABLET ORAL DAILY
Qty: 60 TABLET | Refills: 0 | Status: SHIPPED | OUTPATIENT
Start: 2023-01-04 | End: 2023-03-05

## 2023-01-04 RX ORDER — SERTRALINE HYDROCHLORIDE 100 MG/1
200 TABLET, FILM COATED ORAL DAILY
Qty: 120 TABLET | Refills: 0 | Status: SHIPPED | OUTPATIENT
Start: 2023-01-04 | End: 2023-03-05

## 2023-01-04 NOTE — PROGRESS NOTES
Subjective   Kourtney Velasquez is a 37 y.o. female is here today for medication management follow-up.    Chief Complaint: ADHD, depression, anxiety    History of Present Illness:   Patient denies negative side effects associated with current regimen.  She identifies that she did trial BuSpar and did not feel that it was effective so she discontinued this medication.  Reports that situational stressors at work have dissipated.  Reports that anxiety has decreased and is at baseline.  Depression remains well controlled.  ADHD symptoms remain problematic and patient inquires about restarting Vyvanse to better target the symptoms.  Sleep is well and she is averaging 7 to 8 hours per night without awakenings or nightmares.  Jaswinder appetite has returned to baseline.  4 pounds gain since last encounter. Body mass index is 36.95 kg/m². Denies AVH.  Denies SI and HI.  Denies self-harm.    The following portions of the patient's history were reviewed and updated as appropriate: allergies, current medications, past family history, past medical history, past social history, past surgical history and problem list.    Review of Systems   Constitutional: Negative for activity change, appetite change, fatigue and unexpected weight change.   HENT: Negative for drooling and tinnitus.    Eyes: Negative for photophobia and visual disturbance.   Respiratory: Negative for chest tightness and shortness of breath.    Cardiovascular: Negative for chest pain and palpitations.   Gastrointestinal: Negative for abdominal pain, diarrhea and vomiting.   Endocrine: Negative for polydipsia and polyuria.   Genitourinary: Negative for dysuria, frequency and urgency.   Musculoskeletal: Negative for gait problem and joint swelling.   Skin: Negative for pallor and rash.   Allergic/Immunologic: Negative for environmental allergies and food allergies.   Neurological: Negative for dizziness, tremors, seizures, syncope, facial asymmetry, speech difficulty,  weakness, light-headedness, numbness and headaches.   Hematological: Negative for adenopathy. Does not bruise/bleed easily.   Psychiatric/Behavioral: Positive for decreased concentration. Negative for agitation, behavioral problems, confusion, dysphoric mood, hallucinations, self-injury, sleep disturbance and suicidal ideas. The patient not nervous/anxious.. The patient is not hyperactive.        Objective   Physical Exam  Vitals reviewed.   Constitutional:       Appearance: Normal appearance. She is normal weight.   HENT:      Head: Normocephalic.      Nose: Nose normal.      Mouth/Throat:      Mouth: Mucous membranes are moist.      Pharynx: Oropharynx is clear.   Eyes:      Extraocular Movements: Extraocular movements intact.      Pupils: Pupils are equal, round, and reactive to light.   Cardiovascular:      Rate and Rhythm: Normal rate.   Pulmonary:      Effort: Pulmonary effort is normal.   Abdominal:      General: Abdomen is flat.   Musculoskeletal:         General: Normal range of motion.      Cervical back: Normal range of motion.   Skin:     General: Skin is warm and dry.   Neurological:      General: No focal deficit present.      Mental Status: She is alert and oriented to person, place, and time. Mental status is at baseline.   Psychiatric:         Attention and Perception: Perception normal. She is inattentive.         Mood and Affect: Mood and affect normal. Mood is not depressed.         Speech: Speech normal.         Behavior: Behavior normal. Behavior is cooperative.         Thought Content: Thought content normal.         Cognition and Memory: Cognition and memory normal.         Judgment: Judgment normal.       Blood pressure 127/85, pulse 98, temperature 98 °F (36.7 °C), height 152.4 cm (60\"), weight 85.8 kg (189 lb 3.2 oz), SpO2 96 %, not currently breastfeeding.    Medication List:   Current Outpatient Medications   Medication Sig Dispense Refill   • ARIPiprazole (Abilify) 5 MG tablet Take 1  tablet by mouth Daily for 60 days. 60 tablet 0   • sertraline (ZOLOFT) 100 MG tablet Take 2 tablets by mouth Daily for 60 days. 120 tablet 0   • albuterol sulfate  (90 Base) MCG/ACT inhaler Inhale 2 puffs Every 4 (Four) Hours As Needed for Wheezing. 18 g 2   • cetirizine (zyrTEC) 10 MG tablet Take 1 tablet by mouth Daily. 90 tablet 3   • levothyroxine (SYNTHROID, LEVOTHROID) 125 MCG tablet Take 1 tablet by mouth Daily. 90 tablet 2   • lisdexamfetamine (Vyvanse) 20 MG capsule Take 1 capsule by mouth Every Morning 30 capsule 0   • Magnesium Gluconate (MAGNESIUM 27 PO) Take  by mouth.     • prenatal vitamin (prenatal, CLASSIC, vitamin) tablet Take  by mouth Daily.       No current facility-administered medications for this visit.       Mental Status Exam:   Hygiene:   good  Cooperation:  Cooperative  Eye Contact:  Good  Psychomotor Behavior:  Appropriate  Affect:  Full range  Hopelessness: Denies  Speech:  Normal  Thought Process:  Goal directed  Thought Content:  Normal  Suicidal:  None  Homicidal:  None  Hallucinations:  None  Delusion:  None  Memory:  Intact  Orientation:  Person, Place, Time and Situation  Reliability:  good  Insight:  Good  Judgement:  Good  Impulse Control:  Good  Physical/Medical Issues:  No        Kourtney Velasquez  reports that she has never smoked. She has never used smokeless tobacco.     Prognosis: Good dependent on medication/follow up and treatment plan compliance.  Functional Status: moderate impairment in areas of daily functioning.      Assessment & Plan   Problems Addressed this Visit    None  Visit Diagnoses     ADHD (attention deficit hyperactivity disorder), inattentive type    -  Primary    Relevant Medications    lisdexamfetamine (Vyvanse) 20 MG capsule    sertraline (ZOLOFT) 100 MG tablet    ARIPiprazole (Abilify) 5 MG tablet    Generalized anxiety disorder        Relevant Medications    lisdexamfetamine (Vyvanse) 20 MG capsule    sertraline (ZOLOFT) 100 MG tablet     ARIPiprazole (Abilify) 5 MG tablet    Recurrent major depressive disorder, in partial remission (HCC)        Relevant Medications    lisdexamfetamine (Vyvanse) 20 MG capsule    sertraline (ZOLOFT) 100 MG tablet    ARIPiprazole (Abilify) 5 MG tablet      Diagnoses       Codes Comments    ADHD (attention deficit hyperactivity disorder), inattentive type    -  Primary ICD-10-CM: F90.0  ICD-9-CM: 314.00     Generalized anxiety disorder     ICD-10-CM: F41.1  ICD-9-CM: 300.02     Recurrent major depressive disorder, in partial remission (HCC)     ICD-10-CM: F33.41  ICD-9-CM: 296.35         -Continue Zoloft 200 mg daily for anxiety and depression.  -Continue Abilify 5 mg p.o. daily as adjunct for mood  -Restart Vyvanse at 20 mg p.o. every morning for ADHD symptoms.  -JELLY reviewed and appropriate.   -Continue psychotherapy with LCSW.  -Medications and pharmacy at this time.    Discussed medication and psychotherapy options.  Anxiety has reduced as situational stressors have dissipated.  Depression continues to be well controlled on current regimen of Zoloft and Abilify.  ADHD burden remains elevated therefore I am going to restart Vyvanse.  I am going to restart this at a lower dose as patient did note anxiety increased in the past.  Reintegrated potential side effects associated with the above medications individually as well as in combination.Patient verbalized understanding and wishes to proceed. Reviewed the risks, benefits, and side effects of the medications; patient acknowledged and verbally consented. Patient is agreeable to call the [facility]. Patient is aware to call 911 or go to the nearest ER should begin having SI/HI.     Return in about 8 weeks (around 3/1/2023), or if symptoms worsen or fail to improve, for Next scheduled follow up.        This document has been electronically signed by DANG East  January 4, 2023 13:55 EST   Errors in dictation may reflect use of voice recognition software  and not all errors in transcription may have been detected prior to signing.

## 2023-04-05 DIAGNOSIS — F33.41 RECURRENT MAJOR DEPRESSIVE DISORDER, IN PARTIAL REMISSION: ICD-10-CM

## 2023-04-05 DIAGNOSIS — F41.1 GENERALIZED ANXIETY DISORDER: ICD-10-CM

## 2023-04-05 RX ORDER — SERTRALINE HYDROCHLORIDE 100 MG/1
TABLET, FILM COATED ORAL
Qty: 120 TABLET | Refills: 0 | OUTPATIENT
Start: 2023-04-05

## 2023-04-05 NOTE — TELEPHONE ENCOUNTER
Attempted to contact the pt to schedule an appointment with Trena but she did not answer and I was unable to leave a message.

## 2023-07-26 ENCOUNTER — OFFICE VISIT (OUTPATIENT)
Dept: PSYCHIATRY | Facility: CLINIC | Age: 38
End: 2023-07-26
Payer: COMMERCIAL

## 2023-07-26 VITALS
DIASTOLIC BLOOD PRESSURE: 95 MMHG | OXYGEN SATURATION: 97 % | HEART RATE: 86 BPM | BODY MASS INDEX: 35.42 KG/M2 | SYSTOLIC BLOOD PRESSURE: 136 MMHG | WEIGHT: 180.4 LBS | TEMPERATURE: 98.4 F | HEIGHT: 60 IN

## 2023-07-26 DIAGNOSIS — F41.1 GENERALIZED ANXIETY DISORDER: ICD-10-CM

## 2023-07-26 DIAGNOSIS — F33.41 RECURRENT MAJOR DEPRESSIVE DISORDER, IN PARTIAL REMISSION: ICD-10-CM

## 2023-07-26 PROCEDURE — 99214 OFFICE O/P EST MOD 30 MIN: CPT

## 2023-07-26 RX ORDER — SERTRALINE HYDROCHLORIDE 100 MG/1
200 TABLET, FILM COATED ORAL DAILY
Qty: 180 TABLET | Refills: 1 | Status: SHIPPED | OUTPATIENT
Start: 2023-07-26 | End: 2024-01-22

## 2023-07-26 NOTE — PROGRESS NOTES
Subjective   Kourtney Velasquez is a 38 y.o. female is here today for medication management follow-up.  Patient has not been seen since January 2023.    Chief Complaint: ADHD, depression, anxiety    History of Present Illness:   Patient reports that she has been taking her Zoloft, but has not been taking Abilify or Vyvanse.  She feels that at this time anxiety has been manageable and situationally elevated.  Does not feel depression is problematic.  Reports that she is coping well with ADHD symptoms with behavioral modifications and environmental interventions.  Sleep is doing okay.  She denies any nightmares.  Appetite is vastly unchanged.  9 pounds loss since last encounter. Body mass index is 35.23 kg/m².  Denies AVH.  Denies SI and HI.  Denies self-harm.    The following portions of the patient's history were reviewed and updated as appropriate: allergies, current medications, past family history, past medical history, past social history, past surgical history and problem list.    Review of Systems   Constitutional: Negative for activity change, appetite change, fatigue and unexpected weight change.   HENT: Negative for drooling and tinnitus.    Eyes: Negative for photophobia and visual disturbance.   Respiratory: Negative for chest tightness and shortness of breath.    Cardiovascular: Negative for chest pain and palpitations.   Gastrointestinal: Negative for abdominal pain, diarrhea and vomiting.   Endocrine: Negative for polydipsia and polyuria.   Genitourinary: Negative for dysuria, frequency and urgency.   Musculoskeletal: Negative for gait problem and joint swelling.   Skin: Negative for pallor and rash.   Allergic/Immunologic: Negative for environmental allergies and food allergies.   Neurological: Negative for dizziness, tremors, seizures, syncope, facial asymmetry, speech difficulty, weakness, light-headedness, numbness and headaches.   Hematological: Negative for adenopathy. Does not bruise/bleed easily.  "  Psychiatric/Behavioral: Positive for decreased concentration. Negative for agitation, behavioral problems, confusion, dysphoric mood, hallucinations, self-injury, sleep disturbance and suicidal ideas. The patient not nervous/anxious.. The patient is not hyperactive.        Objective   Physical Exam  Vitals reviewed.   Constitutional:       Appearance: Normal appearance. She is normal weight.   HENT:      Head: Normocephalic.      Nose: Nose normal.      Mouth/Throat:      Mouth: Mucous membranes are moist.      Pharynx: Oropharynx is clear.   Eyes:      Extraocular Movements: Extraocular movements intact.      Pupils: Pupils are equal, round, and reactive to light.   Cardiovascular:      Rate and Rhythm: Normal rate.   Pulmonary:      Effort: Pulmonary effort is normal.   Abdominal:      General: Abdomen is flat.   Musculoskeletal:         General: Normal range of motion.      Cervical back: Normal range of motion.   Skin:     General: Skin is warm and dry.   Neurological:      General: No focal deficit present.      Mental Status: She is alert and oriented to person, place, and time. Mental status is at baseline.   Psychiatric:         Attention and Perception: Perception normal. She is inattentive.         Mood and Affect: Mood and affect normal. Mood is not depressed.         Speech: Speech normal.         Behavior: Behavior normal. Behavior is cooperative.         Thought Content: Thought content normal.         Cognition and Memory: Cognition and memory normal.         Judgment: Judgment normal.       Blood pressure 136/95, pulse 86, temperature 98.4 °F (36.9 °C), height 152.4 cm (60\"), weight 81.8 kg (180 lb 6.4 oz), SpO2 97 %, not currently breastfeeding.    Medication List:   Current Outpatient Medications   Medication Sig Dispense Refill    sertraline (ZOLOFT) 100 MG tablet Take 2 tablets by mouth Daily for 180 days. 180 tablet 1    albuterol sulfate  (90 Base) MCG/ACT inhaler Inhale 2 puffs Every " 4 (Four) Hours As Needed for Wheezing. 18 g 2    cetirizine (zyrTEC) 10 MG tablet Take 1 tablet by mouth Daily. 90 tablet 3    levothyroxine (SYNTHROID, LEVOTHROID) 125 MCG tablet Take 1 tablet by mouth Daily. 90 tablet 2    Magnesium Gluconate (MAGNESIUM 27 PO) Take  by mouth.      prenatal vitamin (prenatal, CLASSIC, vitamin) tablet Take  by mouth Daily.       No current facility-administered medications for this visit.       Mental Status Exam:   Hygiene:   good  Cooperation:  Cooperative  Eye Contact:  Good  Psychomotor Behavior:  Appropriate  Affect:  Full range  Hopelessness: Denies  Speech:  Normal  Thought Process:  Goal directed  Thought Content:  Normal  Suicidal:  None  Homicidal:  None  Hallucinations:  None  Delusion:  None  Memory:  Intact  Orientation:  Person, Place, Time and Situation  Reliability:  good  Insight:  Good  Judgement:  Good  Impulse Control:  Good  Physical/Medical Issues:  No        Kourtney Velasquez  reports that she has never smoked. She has never used smokeless tobacco.     Prognosis: Good dependent on medication/follow up and treatment plan compliance.  Functional Status: moderate impairment in areas of daily functioning.      Assessment & Plan   Problems Addressed this Visit    None  Visit Diagnoses       Generalized anxiety disorder        Relevant Medications    sertraline (ZOLOFT) 100 MG tablet    Recurrent major depressive disorder, in partial remission        Relevant Medications    sertraline (ZOLOFT) 100 MG tablet          Diagnoses         Codes Comments    Generalized anxiety disorder     ICD-10-CM: F41.1  ICD-9-CM: 300.02     Recurrent major depressive disorder, in partial remission     ICD-10-CM: F33.41  ICD-9-CM: 296.35           -Continue Zoloft 200 mg daily for anxiety and depression.  -JELLY reviewed and appropriate.   -Continue psychotherapy with LCSW.  -Medications and pharmacy at this time.    Discussed medication and psychotherapy options.  Patient is doing well  at this time.  She is to continue Zoloft without change.  Hold Vyvanse and Abilify for now. Reintegrated potential side effects associated with the above medications individually. .Patient verbalized understanding and wishes to proceed. Reviewed the risks, benefits, and side effects of the medications; patient acknowledged and verbally consented. Patient is agreeable to call the [facility]. Patient is aware to call 911 or go to the nearest ER should begin having SI/HI.     Return in about 6 months (around 1/26/2024), or if symptoms worsen or fail to improve, for Next scheduled follow up.        This document has been electronically signed by DANG East  July 26, 2023 14:27 EDT   Errors in dictation may reflect use of voice recognition software and not all errors in transcription may have been detected prior to signing.

## 2023-08-03 ENCOUNTER — OFFICE VISIT (OUTPATIENT)
Dept: FAMILY MEDICINE CLINIC | Facility: CLINIC | Age: 38
End: 2023-08-03
Payer: COMMERCIAL

## 2023-08-03 VITALS
TEMPERATURE: 97.8 F | HEART RATE: 105 BPM | SYSTOLIC BLOOD PRESSURE: 118 MMHG | WEIGHT: 178.8 LBS | HEIGHT: 60 IN | BODY MASS INDEX: 35.1 KG/M2 | DIASTOLIC BLOOD PRESSURE: 78 MMHG | OXYGEN SATURATION: 98 %

## 2023-08-03 DIAGNOSIS — F33.41 RECURRENT MAJOR DEPRESSIVE DISORDER, IN PARTIAL REMISSION: ICD-10-CM

## 2023-08-03 DIAGNOSIS — F41.1 GENERALIZED ANXIETY DISORDER: ICD-10-CM

## 2023-08-03 DIAGNOSIS — R05.3 CHRONIC COUGH: ICD-10-CM

## 2023-08-03 DIAGNOSIS — J30.2 SEASONAL ALLERGIES: ICD-10-CM

## 2023-08-03 DIAGNOSIS — J45.20 MILD INTERMITTENT EXTRINSIC ASTHMA WITHOUT COMPLICATION: ICD-10-CM

## 2023-08-03 DIAGNOSIS — Z00.00 ANNUAL PHYSICAL EXAM: Primary | ICD-10-CM

## 2023-08-03 DIAGNOSIS — E03.9 ACQUIRED HYPOTHYROIDISM: ICD-10-CM

## 2023-08-03 PROCEDURE — 99395 PREV VISIT EST AGE 18-39: CPT | Performed by: FAMILY MEDICINE

## 2023-08-03 RX ORDER — CETIRIZINE HYDROCHLORIDE 10 MG/1
10 TABLET ORAL DAILY
Qty: 90 TABLET | Refills: 3 | Status: SHIPPED | OUTPATIENT
Start: 2023-08-03

## 2023-08-03 RX ORDER — LEVOTHYROXINE SODIUM 0.12 MG/1
125 TABLET ORAL DAILY
Qty: 90 TABLET | Refills: 3 | Status: SHIPPED | OUTPATIENT
Start: 2023-08-03

## 2023-08-22 ENCOUNTER — TELEPHONE (OUTPATIENT)
Dept: FAMILY MEDICINE CLINIC | Facility: CLINIC | Age: 38
End: 2023-08-22
Payer: COMMERCIAL

## 2024-05-20 DIAGNOSIS — J30.2 SEASONAL ALLERGIES: ICD-10-CM

## 2024-05-20 RX ORDER — CETIRIZINE HYDROCHLORIDE 10 MG/1
10 TABLET ORAL DAILY
Qty: 90 TABLET | Refills: 3 | OUTPATIENT
Start: 2024-05-20

## 2024-06-12 ENCOUNTER — OFFICE VISIT (OUTPATIENT)
Dept: PSYCHIATRY | Facility: CLINIC | Age: 39
End: 2024-06-12
Payer: COMMERCIAL

## 2024-06-12 VITALS
BODY MASS INDEX: 33.38 KG/M2 | OXYGEN SATURATION: 95 % | TEMPERATURE: 97.8 F | WEIGHT: 170 LBS | SYSTOLIC BLOOD PRESSURE: 114 MMHG | HEART RATE: 89 BPM | DIASTOLIC BLOOD PRESSURE: 79 MMHG | HEIGHT: 60 IN

## 2024-06-12 DIAGNOSIS — F90.0 ATTENTION DEFICIT HYPERACTIVITY DISORDER (ADHD), PREDOMINANTLY INATTENTIVE TYPE: Primary | ICD-10-CM

## 2024-06-12 DIAGNOSIS — F33.42 RECURRENT MAJOR DEPRESSIVE DISORDER, IN FULL REMISSION: ICD-10-CM

## 2024-06-12 DIAGNOSIS — F41.1 GENERALIZED ANXIETY DISORDER: ICD-10-CM

## 2024-06-12 PROCEDURE — 99214 OFFICE O/P EST MOD 30 MIN: CPT

## 2024-06-12 RX ORDER — DEXTROAMPHETAMINE SACCHARATE, AMPHETAMINE ASPARTATE MONOHYDRATE, DEXTROAMPHETAMINE SULFATE AND AMPHETAMINE SULFATE 2.5; 2.5; 2.5; 2.5 MG/1; MG/1; MG/1; MG/1
10 CAPSULE, EXTENDED RELEASE ORAL DAILY
Qty: 30 CAPSULE | Refills: 0 | Status: SHIPPED | OUTPATIENT
Start: 2024-06-12 | End: 2024-07-12

## 2024-06-12 RX ORDER — SERTRALINE HYDROCHLORIDE 100 MG/1
200 TABLET, FILM COATED ORAL DAILY
Qty: 180 TABLET | Refills: 3 | Status: SHIPPED | OUTPATIENT
Start: 2024-06-12 | End: 2025-06-12

## 2024-06-12 NOTE — PROGRESS NOTES
Subjective   Kourtney Velasquez is a 39 y.o. female is here today for medication management follow-up.  Patient has not been seen since July 2023.    Chief Complaint: ADHD, depression, anxiety    History of Present Illness:     Patient denies negative side effects.  Feels that anxiety and depression symptoms are controlled on current regimen of Zoloft.  Reports that overall things have been going very well for her.  She reports that interest today would be restarting ADHD treatment.  She reports that she is restarting college in August and is very nervous.  She reports that she does feel that she does okay in the home setting for the most part with ADHD symptoms but struggles significantly in the academic setting.  Reports that she is often rereading, difficulty with her attention, procrastinating, forgetful, and disorganized.  Sleep is doing well.  No reported concerns with appetite. Body mass index is 33.2 kg/m². Denies AVH.  Denies SI and HI.  Denies self-harm.    The following portions of the patient's history were reviewed and updated as appropriate: allergies, current medications, past family history, past medical history, past social history, past surgical history and problem list.    Review of Systems   Constitutional: Negative for activity change, appetite change, fatigue and unexpected weight change.   HENT: Negative for drooling and tinnitus.    Eyes: Negative for photophobia and visual disturbance.   Respiratory: Negative for chest tightness and shortness of breath.    Cardiovascular: Negative for chest pain and palpitations.   Gastrointestinal: Negative for abdominal pain, diarrhea and vomiting.   Endocrine: Negative for polydipsia and polyuria.   Genitourinary: Negative for dysuria, frequency and urgency.   Musculoskeletal: Negative for gait problem and joint swelling.   Skin: Negative for pallor and rash.   Allergic/Immunologic: Negative for environmental allergies and food allergies.   Neurological:  "Negative for dizziness, tremors, seizures, syncope, facial asymmetry, speech difficulty, weakness, light-headedness, numbness and headaches.   Hematological: Negative for adenopathy. Does not bruise/bleed easily.   Psychiatric/Behavioral: Positive for decreased concentration. Negative for agitation, behavioral problems, confusion, dysphoric mood, hallucinations, self-injury, sleep disturbance and suicidal ideas. The patient not nervous/anxious.. The patient is not hyperactive.        Objective   Physical Exam  Vitals reviewed.   Constitutional:       Appearance: Normal appearance. She is normal weight.   HENT:      Head: Normocephalic.      Nose: Nose normal.      Mouth/Throat:      Mouth: Mucous membranes are moist.      Pharynx: Oropharynx is clear.   Eyes:      Extraocular Movements: Extraocular movements intact.      Pupils: Pupils are equal, round, and reactive to light.   Cardiovascular:      Rate and Rhythm: Normal rate.   Pulmonary:      Effort: Pulmonary effort is normal.   Abdominal:      General: Abdomen is flat.   Musculoskeletal:         General: Normal range of motion.      Cervical back: Normal range of motion.   Skin:     General: Skin is warm and dry.   Neurological:      General: No focal deficit present.      Mental Status: She is alert and oriented to person, place, and time. Mental status is at baseline.   Psychiatric:         Attention and Perception: Perception normal. She is inattentive.         Mood and Affect: Mood and affect normal. Mood is not depressed.         Speech: Speech normal.         Behavior: Behavior normal. Behavior is cooperative.         Thought Content: Thought content normal.         Cognition and Memory: Cognition and memory normal.         Judgment: Judgment normal.       Blood pressure 114/79, pulse 89, temperature 97.8 °F (36.6 °C), height 152.4 cm (60\"), weight 77.1 kg (170 lb), SpO2 95%, not currently breastfeeding.    Medication List:   Current Outpatient " Medications   Medication Sig Dispense Refill    sertraline (ZOLOFT) 100 MG tablet Take 2 tablets by mouth Daily. 180 tablet 3    albuterol sulfate  (90 Base) MCG/ACT inhaler Inhale 2 puffs Every 4 (Four) Hours As Needed for Wheezing. 18 g 2    amphetamine-dextroamphetamine XR (Adderall XR) 10 MG 24 hr capsule Take 1 capsule by mouth Daily for 30 days 30 capsule 0    cetirizine (zyrTEC) 10 MG tablet Take 1 tablet by mouth Daily. 90 tablet 3    levothyroxine (SYNTHROID, LEVOTHROID) 125 MCG tablet Take 1 tablet by mouth Daily. 90 tablet 3    Magnesium Gluconate (MAGNESIUM 27 PO) Take  by mouth.      prenatal vitamin (prenatal, CLASSIC, vitamin) tablet Take  by mouth Daily.       No current facility-administered medications for this visit.       Mental Status Exam:   Hygiene:   good  Cooperation:  Cooperative  Eye Contact:  Good  Psychomotor Behavior:  Appropriate  Affect:  Full range  Hopelessness: Denies  Speech:  Normal  Thought Process:  Goal directed  Thought Content:  Normal  Suicidal:  None  Homicidal:  None  Hallucinations:  None  Delusion:  None  Memory:  Intact  Orientation:  Person, Place, Time and Situation  Reliability:  good  Insight:  Good  Judgement:  Good  Impulse Control:  Good  Physical/Medical Issues:  No        Kourtney Velasquez  reports that she has never smoked. She has never used smokeless tobacco.     Prognosis: Good dependent on medication/follow up and treatment plan compliance.  Functional Status: moderate impairment in areas of daily functioning.      Assessment & Plan   Problems Addressed this Visit    None  Visit Diagnoses       Attention deficit hyperactivity disorder (ADHD), predominantly inattentive type    -  Primary    Relevant Medications    sertraline (ZOLOFT) 100 MG tablet    amphetamine-dextroamphetamine XR (Adderall XR) 10 MG 24 hr capsule    Generalized anxiety disorder        Relevant Medications    sertraline (ZOLOFT) 100 MG tablet    amphetamine-dextroamphetamine XR  (Adderall XR) 10 MG 24 hr capsule    Recurrent major depressive disorder, in full remission        Relevant Medications    sertraline (ZOLOFT) 100 MG tablet    amphetamine-dextroamphetamine XR (Adderall XR) 10 MG 24 hr capsule          Diagnoses         Codes Comments    Attention deficit hyperactivity disorder (ADHD), predominantly inattentive type    -  Primary ICD-10-CM: F90.0  ICD-9-CM: 314.00     Generalized anxiety disorder     ICD-10-CM: F41.1  ICD-9-CM: 300.02     Recurrent major depressive disorder, in full remission     ICD-10-CM: F33.42  ICD-9-CM: 296.36           -Continue Zoloft 200 mg daily for anxiety and depression.  -Start Adderall XR 10 mg p.o. daily for ADHD  -JELLY reviewed and appropriate.   -UDS next encounter  -Continue psychotherapy with LCSW.  -Medications and pharmacy at this time.    Discussed medication and psychotherapy options.  Patient's anxiety and depression are well controlled on current regimen of Zoloft.  Starting Adderall as described above for ADHD symptoms.  Reiterated safe and appropriate use of controlled medications.  Reiterated potential side effects that can be seen with stimulant medications.  Reiterated narcotic agreement that is on file.  Patient verbalized understanding and wishes to proceed. Reviewed the risks, benefits, and side effects of the medications; patient acknowledged and verbally consented. Patient is agreeable to call the [facility]. Patient is aware to call 911 or go to the nearest ER should begin having SI/HI.     Return in about 4 weeks (around 7/10/2024), or if symptoms worsen or fail to improve, for Next scheduled follow up.        This document has been electronically signed by DANG East  June 12, 2024 15:20 EDT   Errors in dictation may reflect use of voice recognition software and not all errors in transcription may have been detected prior to signing.

## 2024-07-11 DIAGNOSIS — F90.0 ATTENTION DEFICIT HYPERACTIVITY DISORDER (ADHD), PREDOMINANTLY INATTENTIVE TYPE: ICD-10-CM

## 2024-07-11 RX ORDER — DEXTROAMPHETAMINE SACCHARATE, AMPHETAMINE ASPARTATE MONOHYDRATE, DEXTROAMPHETAMINE SULFATE AND AMPHETAMINE SULFATE 2.5; 2.5; 2.5; 2.5 MG/1; MG/1; MG/1; MG/1
10 CAPSULE, EXTENDED RELEASE ORAL DAILY
Qty: 30 CAPSULE | Refills: 0 | Status: SHIPPED | OUTPATIENT
Start: 2024-07-11 | End: 2024-08-10

## 2024-07-17 ENCOUNTER — OFFICE VISIT (OUTPATIENT)
Dept: PSYCHIATRY | Facility: CLINIC | Age: 39
End: 2024-07-17
Payer: COMMERCIAL

## 2024-07-17 VITALS
SYSTOLIC BLOOD PRESSURE: 135 MMHG | HEART RATE: 93 BPM | DIASTOLIC BLOOD PRESSURE: 86 MMHG | WEIGHT: 168.4 LBS | OXYGEN SATURATION: 98 % | BODY MASS INDEX: 33.06 KG/M2 | HEIGHT: 60 IN

## 2024-07-17 DIAGNOSIS — F33.42 RECURRENT MAJOR DEPRESSIVE DISORDER, IN FULL REMISSION: ICD-10-CM

## 2024-07-17 DIAGNOSIS — F90.0 ATTENTION DEFICIT HYPERACTIVITY DISORDER (ADHD), PREDOMINANTLY INATTENTIVE TYPE: Primary | ICD-10-CM

## 2024-07-17 DIAGNOSIS — F41.1 GENERALIZED ANXIETY DISORDER: ICD-10-CM

## 2024-07-17 PROCEDURE — 99214 OFFICE O/P EST MOD 30 MIN: CPT

## 2024-07-17 NOTE — PROGRESS NOTES
Subjective   Kourtney Velasquez is a 39 y.o. female is here today for medication management follow-up.  Patient presents with her 2 sons in which she gives me permission to speak in front of.  Chief Complaint: ADHD, depression, anxiety    History of Present Illness:     Patient denies negative side effects.  Patient reports that she has not been able to get her medication filled that was called in last week related to medication not being available at her pharmacy.  She reports that she has been able to tell positive improvement in regards to attention, focus, forgetfulness, disorganization, and time management.  She reports that she is anxious with the upcoming school year.  Feels that anxiety and depression is well controlled on current medication regimen.  Does report that since starting Adderall XR she feels that anxiety has improved.  No change in sleep.  No change in appetite. Body mass index is 32.89 kg/m². Denies AVH.  Denies SI and HI.  Denies self-harm.    The following portions of the patient's history were reviewed and updated as appropriate: allergies, current medications, past family history, past medical history, past social history, past surgical history and problem list.    Review of Systems   Constitutional: Negative for activity change, appetite change, fatigue and unexpected weight change.   HENT: Negative for drooling and tinnitus.    Eyes: Negative for photophobia and visual disturbance.   Respiratory: Negative for chest tightness and shortness of breath.    Cardiovascular: Negative for chest pain and palpitations.   Gastrointestinal: Negative for abdominal pain, diarrhea and vomiting.   Endocrine: Negative for polydipsia and polyuria.   Genitourinary: Negative for dysuria, frequency and urgency.   Musculoskeletal: Negative for gait problem and joint swelling.   Skin: Negative for pallor and rash.   Allergic/Immunologic: Negative for environmental allergies and food allergies.   Neurological:  "Negative for dizziness, tremors, seizures, syncope, facial asymmetry, speech difficulty, weakness, light-headedness, numbness and headaches.   Hematological: Negative for adenopathy. Does not bruise/bleed easily.   Psychiatric/Behavioral: Positive for decreased concentration. Negative for agitation, behavioral problems, confusion, dysphoric mood, hallucinations, self-injury, sleep disturbance and suicidal ideas. The patient not nervous/anxious.. The patient is not hyperactive.        Objective   Physical Exam  Vitals reviewed.   Constitutional:       Appearance: Normal appearance. She is normal weight.   HENT:      Head: Normocephalic.      Nose: Nose normal.      Mouth/Throat:      Mouth: Mucous membranes are moist.      Pharynx: Oropharynx is clear.   Eyes:      Extraocular Movements: Extraocular movements intact.      Pupils: Pupils are equal, round, and reactive to light.   Cardiovascular:      Rate and Rhythm: Normal rate.   Pulmonary:      Effort: Pulmonary effort is normal.   Abdominal:      General: Abdomen is flat.   Musculoskeletal:         General: Normal range of motion.      Cervical back: Normal range of motion.   Skin:     General: Skin is warm and dry.   Neurological:      General: No focal deficit present.      Mental Status: She is alert and oriented to person, place, and time. Mental status is at baseline.   Psychiatric:         Attention and Perception: Perception normal. She is inattentive.         Mood and Affect: Mood and affect normal. Mood is not depressed.         Speech: Speech normal.         Behavior: Behavior normal. Behavior is cooperative.         Thought Content: Thought content normal.         Cognition and Memory: Cognition and memory normal.         Judgment: Judgment normal.       Blood pressure 135/86, pulse 93, height 152.4 cm (60\"), weight 76.4 kg (168 lb 6.4 oz), SpO2 98%, not currently breastfeeding.    Medication List:   Current Outpatient Medications   Medication Sig " Dispense Refill    albuterol sulfate  (90 Base) MCG/ACT inhaler Inhale 2 puffs Every 4 (Four) Hours As Needed for Wheezing. 18 g 2    amphetamine-dextroamphetamine XR (Adderall XR) 10 MG 24 hr capsule Take 1 capsule by mouth Daily for 30 days 30 capsule 0    cetirizine (zyrTEC) 10 MG tablet Take 1 tablet by mouth Daily. 90 tablet 3    levothyroxine (SYNTHROID, LEVOTHROID) 125 MCG tablet Take 1 tablet by mouth Daily. 90 tablet 3    Magnesium Gluconate (MAGNESIUM 27 PO) Take  by mouth.      prenatal vitamin (prenatal, CLASSIC, vitamin) tablet Take  by mouth Daily.      sertraline (ZOLOFT) 100 MG tablet Take 2 tablets by mouth Daily. 180 tablet 3     No current facility-administered medications for this visit.       Mental Status Exam:   Hygiene:   good  Cooperation:  Cooperative  Eye Contact:  Good  Psychomotor Behavior:  Appropriate  Affect:  Full range  Hopelessness: Denies  Speech:  Normal  Thought Process:  Goal directed  Thought Content:  Normal  Suicidal:  None  Homicidal:  None  Hallucinations:  None  Delusion:  None  Memory:  Intact  Orientation:  Person, Place, Time and Situation  Reliability:  good  Insight:  Good  Judgement:  Good  Impulse Control:  Good  Physical/Medical Issues:  No        Kourtney Velasquez  reports that she has never smoked. She has never used smokeless tobacco.     Prognosis: Good dependent on medication/follow up and treatment plan compliance.  Functional Status: moderate impairment in areas of daily functioning.      Assessment & Plan   Problems Addressed this Visit    None  Visit Diagnoses       Attention deficit hyperactivity disorder (ADHD), predominantly inattentive type    -  Primary    Generalized anxiety disorder        Recurrent major depressive disorder, in full remission              Diagnoses         Codes Comments    Attention deficit hyperactivity disorder (ADHD), predominantly inattentive type    -  Primary ICD-10-CM: F90.0  ICD-9-CM: 314.00     Generalized anxiety  disorder     ICD-10-CM: F41.1  ICD-9-CM: 300.02     Recurrent major depressive disorder, in full remission     ICD-10-CM: F33.42  ICD-9-CM: 296.36           -Continue Zoloft 200 mg daily for anxiety and depression.  -Continue Adderall XR 10 mg p.o. daily for ADHD  -JELLY reviewed and appropriate.   -UDS collected and pending   -Continue psychotherapy with LCSW.  -Medications are not due for refill at this time.  Adderall last admitted on 7/11/2024.    Discussed medication and psychotherapy options.  Overall at this time patient is doing well on current medication regimen and is pleased with her progress.  Plan will be to continue Zoloft and Adderall XR without change.  Plan to follow-up 2 weeks after school starts back to see how she is doing with medication.  UDS has been collected likely to be negative as she has not had medication in a few days associated to drug availability.  I have reiterated with her side effects increasing with each medication individually as well as in combination.Reviewed the risks, benefits, and side effects of the medications; patient acknowledged and verbally consented. Patient is agreeable to call the [facility]. Patient is aware to call 911 or go to the nearest ER should begin having SI/HI.     Return in about 8 weeks (around 9/11/2024), or if symptoms worsen or fail to improve, for Next scheduled follow up.        This document has been electronically signed by DANG East  July 17, 2024 17:15 EDT   Errors in dictation may reflect use of voice recognition software and not all errors in transcription may have been detected prior to signing.

## 2024-07-18 ENCOUNTER — TELEPHONE (OUTPATIENT)
Dept: FAMILY MEDICINE CLINIC | Facility: CLINIC | Age: 39
End: 2024-07-18
Payer: COMMERCIAL

## 2024-07-18 NOTE — TELEPHONE ENCOUNTER
Can you all her Adderall in to [facility] in [place].  The pharmacy you sent it to doesn't have any      Pharmacy has been changed for you

## 2024-07-23 DIAGNOSIS — F90.0 ATTENTION DEFICIT HYPERACTIVITY DISORDER (ADHD), PREDOMINANTLY INATTENTIVE TYPE: ICD-10-CM

## 2024-07-23 RX ORDER — DEXTROAMPHETAMINE SACCHARATE, AMPHETAMINE ASPARTATE MONOHYDRATE, DEXTROAMPHETAMINE SULFATE AND AMPHETAMINE SULFATE 2.5; 2.5; 2.5; 2.5 MG/1; MG/1; MG/1; MG/1
10 CAPSULE, EXTENDED RELEASE ORAL DAILY
Qty: 30 CAPSULE | Refills: 0 | Status: SHIPPED | OUTPATIENT
Start: 2024-07-23 | End: 2024-08-22

## 2025-01-08 ENCOUNTER — OFFICE VISIT (OUTPATIENT)
Dept: PSYCHIATRY | Facility: CLINIC | Age: 40
End: 2025-01-08
Payer: COMMERCIAL

## 2025-01-08 VITALS
HEIGHT: 60 IN | WEIGHT: 168 LBS | HEART RATE: 74 BPM | BODY MASS INDEX: 32.98 KG/M2 | OXYGEN SATURATION: 97 % | SYSTOLIC BLOOD PRESSURE: 137 MMHG | DIASTOLIC BLOOD PRESSURE: 78 MMHG

## 2025-01-08 DIAGNOSIS — F90.0 ATTENTION DEFICIT HYPERACTIVITY DISORDER (ADHD), PREDOMINANTLY INATTENTIVE TYPE: Primary | ICD-10-CM

## 2025-01-08 DIAGNOSIS — F41.1 GENERALIZED ANXIETY DISORDER: ICD-10-CM

## 2025-01-08 DIAGNOSIS — F33.42 RECURRENT MAJOR DEPRESSIVE DISORDER, IN FULL REMISSION: ICD-10-CM

## 2025-01-08 PROCEDURE — 99214 OFFICE O/P EST MOD 30 MIN: CPT

## 2025-01-08 RX ORDER — DEXTROAMPHETAMINE SACCHARATE, AMPHETAMINE ASPARTATE MONOHYDRATE, DEXTROAMPHETAMINE SULFATE AND AMPHETAMINE SULFATE 2.5; 2.5; 2.5; 2.5 MG/1; MG/1; MG/1; MG/1
10 CAPSULE, EXTENDED RELEASE ORAL DAILY
Qty: 30 CAPSULE | Refills: 0 | Status: SHIPPED | OUTPATIENT
Start: 2025-01-08 | End: 2025-02-07

## 2025-01-08 NOTE — PROGRESS NOTES
Subjective   Kourtney Velasquez is a 39 y.o. female is here today for medication management follow-up.  Patient presents with her 2 sons in which she gives me permission to speak in front of.  Chief Complaint: ADHD, depression, anxiety    History of Present Illness:     Patient denies negative side effects.  Patient denies any anxiety or depression.  Reports that she has been taking Adderall XR as needed.  Reports that she is due for a refill.  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  She identifies that she has been forgetting to call in for a refill.  She has been able to tell positive improvement in regards to attention, focus, forgetfulness, disorganization, and time management when she does take Adderall.  Sleep and appetite are appropriate.  Body mass index is 32.81 kg/m². Denies AVH.  Denies SI and HI.  Denies self-harm.    The following portions of the patient's history were reviewed and updated as appropriate: allergies, current medications, past family history, past medical history, past social history, past surgical history and problem list.    Review of Systems   Constitutional: Negative for activity change, appetite change, fatigue and unexpected weight change.   HENT: Negative for drooling and tinnitus.    Eyes: Negative for photophobia and visual disturbance.   Respiratory: Negative for chest tightness and shortness of breath.    Cardiovascular: Negative for chest pain and palpitations.   Gastrointestinal: Negative for abdominal pain, diarrhea and vomiting.   Endocrine: Negative for polydipsia and polyuria.   Genitourinary: Negative for dysuria, frequency and urgency.   Musculoskeletal: Negative for gait problem and joint swelling.   Skin: Negative for pallor and rash.   Allergic/Immunologic: Negative for environmental allergies and food allergies.   Neurological: Negative for dizziness, tremors, seizures, syncope,  "facial asymmetry, speech difficulty, weakness, light-headedness, numbness and headaches.   Hematological: Negative for adenopathy. Does not bruise/bleed easily.   Psychiatric/Behavioral: Positive for decreased concentration. Negative for agitation, behavioral problems, confusion, dysphoric mood, hallucinations, self-injury, sleep disturbance and suicidal ideas. The patient not nervous/anxious.. The patient is not hyperactive.        Objective   Physical Exam  Vitals reviewed.   Constitutional:       Appearance: Normal appearance. She is normal weight.   HENT:      Head: Normocephalic.      Nose: Nose normal.      Mouth/Throat:      Mouth: Mucous membranes are moist.      Pharynx: Oropharynx is clear.   Eyes:      Extraocular Movements: Extraocular movements intact.      Pupils: Pupils are equal, round, and reactive to light.   Cardiovascular:      Rate and Rhythm: Normal rate.   Pulmonary:      Effort: Pulmonary effort is normal.   Abdominal:      General: Abdomen is flat.   Musculoskeletal:         General: Normal range of motion.      Cervical back: Normal range of motion.   Skin:     General: Skin is warm and dry.   Neurological:      General: No focal deficit present.      Mental Status: She is alert and oriented to person, place, and time. Mental status is at baseline.   Psychiatric:         Attention and Perception: Perception normal. She is inattentive.         Mood and Affect: Mood and affect normal. Mood is not depressed.         Speech: Speech normal.         Behavior: Behavior normal. Behavior is cooperative.         Thought Content: Thought content normal.         Cognition and Memory: Cognition and memory normal.         Judgment: Judgment normal.       Blood pressure 137/78, pulse 74, height 152.4 cm (60\"), weight 76.2 kg (168 lb), SpO2 97%, not currently breastfeeding.    Medication List:   Current Outpatient Medications   Medication Sig Dispense Refill    albuterol sulfate  (90 Base) MCG/ACT " inhaler Inhale 2 puffs Every 4 (Four) Hours As Needed for Wheezing. 18 g 2    amphetamine-dextroamphetamine XR (Adderall XR) 10 MG 24 hr capsule Take 1 capsule by mouth Daily for 30 days 30 capsule 0    cetirizine (zyrTEC) 10 MG tablet Take 1 tablet by mouth Daily. 90 tablet 3    levothyroxine (SYNTHROID, LEVOTHROID) 125 MCG tablet Take 1 tablet by mouth Daily. 90 tablet 3    Magnesium Gluconate (MAGNESIUM 27 PO) Take  by mouth.      prenatal vitamin (prenatal, CLASSIC, vitamin) tablet Take  by mouth Daily.      sertraline (ZOLOFT) 100 MG tablet Take 2 tablets by mouth Daily. 180 tablet 3     No current facility-administered medications for this visit.       Mental Status Exam:   Hygiene:   good  Cooperation:  Cooperative  Eye Contact:  Good  Psychomotor Behavior:  Appropriate  Affect:  Full range  Hopelessness: Denies  Speech:  Normal  Thought Process:  Goal directed  Thought Content:  Normal  Suicidal:  None  Homicidal:  None  Hallucinations:  None  Delusion:  None  Memory:  Intact  Orientation:  Person, Place, Time and Situation  Reliability:  good  Insight:  Good  Judgement:  Good  Impulse Control:  Good  Physical/Medical Issues:  No        Kourtney Velasquez  reports that she has never smoked. She has never used smokeless tobacco.     Prognosis: Good dependent on medication/follow up and treatment plan compliance.  Functional Status: moderate impairment in areas of daily functioning.      Assessment & Plan   Problems Addressed this Visit    None  Visit Diagnoses       Attention deficit hyperactivity disorder (ADHD), predominantly inattentive type    -  Primary    Relevant Medications    amphetamine-dextroamphetamine XR (Adderall XR) 10 MG 24 hr capsule    Generalized anxiety disorder        Relevant Medications    amphetamine-dextroamphetamine XR (Adderall XR) 10 MG 24 hr capsule    Recurrent major depressive disorder, in full remission        Relevant Medications    amphetamine-dextroamphetamine XR (Adderall XR)  10 MG 24 hr capsule          Diagnoses         Codes Comments    Attention deficit hyperactivity disorder (ADHD), predominantly inattentive type    -  Primary ICD-10-CM: F90.0  ICD-9-CM: 314.00     Generalized anxiety disorder     ICD-10-CM: F41.1  ICD-9-CM: 300.02     Recurrent major depressive disorder, in full remission     ICD-10-CM: F33.42  ICD-9-CM: 296.36           -Continue Zoloft 200 mg daily for anxiety and depression.  -Continue Adderall XR 10 mg p.o. daily for ADHD  -JELLY reviewed and appropriate.   -UDS reviewed from last encounter negative, expected with as needed use  -Continue psychotherapy with LCSW.  -Medications submitted to pharmacy at this time    Discussed medication and psychotherapy options. Plan at this time will be to continue Zoloft.  Adderall XR as above.  I have reiterated with her side effects that can be seen with each medication individually as well as in combination.Reviewed the risks, benefits, and side effects of the medications; patient acknowledged and verbally consented. Patient is agreeable to call the [facility]. Patient is aware to call 911 or go to the nearest ER should begin having SI/HI.     Return in about 3 months (around 4/8/2025), or if symptoms worsen or fail to improve, for Next scheduled follow up.        This document has been electronically signed by DANG East  January 8, 2025 16:46 EST   Errors in dictation may reflect use of voice recognition software and not all errors in transcription may have been detected prior to signing.